# Patient Record
Sex: FEMALE | Race: WHITE | URBAN - METROPOLITAN AREA
[De-identification: names, ages, dates, MRNs, and addresses within clinical notes are randomized per-mention and may not be internally consistent; named-entity substitution may affect disease eponyms.]

---

## 2017-01-03 DIAGNOSIS — M79.7 FIBROMYALGIA: Primary | ICD-10-CM

## 2017-01-03 RX ORDER — PREGABALIN 100 MG
CAPSULE ORAL
Qty: 270 CAPSULE | Refills: 0 | Status: SHIPPED | OUTPATIENT
Start: 2017-01-03 | End: 2017-02-06

## 2017-01-03 NOTE — TELEPHONE ENCOUNTER
Lyrica      Last Written Prescription Date:  11/14/16  Last Fill Quantity: 90,   # refills: 1  Last Office Visit with [facility], [facility] or [facility] prescribing provider: 07/05/16 Mariana Brantley PA-C, [facility]    Future Office visit:       Routing refill request to provider for review/approval because:  Drug not on the [facility], [facility] or [facility] refill protocol or controlled substance    NINA JackR)

## 2017-01-04 DIAGNOSIS — F43.10 PTSD (POST-TRAUMATIC STRESS DISORDER): Primary | ICD-10-CM

## 2017-01-04 RX ORDER — PRAZOSIN HYDROCHLORIDE 1 MG/1
2-3 CAPSULE ORAL AT BEDTIME
Qty: 90 CAPSULE | Refills: 0 | Status: SHIPPED | OUTPATIENT
Start: 2017-01-04 | End: 2017-03-22

## 2017-01-04 NOTE — TELEPHONE ENCOUNTER
Sailaja Garcias last visit with Dr. Khan 8/24/16. Has since had two no show's for appointments with him.     Refill request rec'd for Prazosin. Will route to PCP.

## 2017-01-06 ENCOUNTER — OFFICE VISIT (OUTPATIENT)
Dept: FAMILY MEDICINE | Facility: CLINIC | Age: 42
End: 2017-01-06
Payer: COMMERCIAL

## 2017-01-06 VITALS
HEART RATE: 66 BPM | SYSTOLIC BLOOD PRESSURE: 106 MMHG | HEIGHT: 65 IN | RESPIRATION RATE: 12 BRPM | DIASTOLIC BLOOD PRESSURE: 70 MMHG | TEMPERATURE: 97.7 F | BODY MASS INDEX: 19.86 KG/M2 | WEIGHT: 119.2 LBS | OXYGEN SATURATION: 100 %

## 2017-01-06 DIAGNOSIS — R55 SYNCOPE, UNSPECIFIED SYNCOPE TYPE: ICD-10-CM

## 2017-01-06 DIAGNOSIS — M25.561 CHRONIC PAIN OF RIGHT KNEE: ICD-10-CM

## 2017-01-06 DIAGNOSIS — Z23 NEED FOR PROPHYLACTIC VACCINATION AND INOCULATION AGAINST INFLUENZA: ICD-10-CM

## 2017-01-06 DIAGNOSIS — F31.81 BIPOLAR 2 DISORDER (H): Primary | ICD-10-CM

## 2017-01-06 DIAGNOSIS — R10.84 ABDOMINAL PAIN, GENERALIZED: ICD-10-CM

## 2017-01-06 DIAGNOSIS — F41.1 GENERALIZED ANXIETY DISORDER: ICD-10-CM

## 2017-01-06 DIAGNOSIS — M79.7 FIBROMYALGIA: ICD-10-CM

## 2017-01-06 DIAGNOSIS — Z12.31 VISIT FOR SCREENING MAMMOGRAM: ICD-10-CM

## 2017-01-06 DIAGNOSIS — G89.4 CHRONIC PAIN SYNDROME: ICD-10-CM

## 2017-01-06 DIAGNOSIS — K52.9 CHRONIC DIARRHEA: ICD-10-CM

## 2017-01-06 DIAGNOSIS — G89.29 CHRONIC PAIN OF RIGHT KNEE: ICD-10-CM

## 2017-01-06 DIAGNOSIS — F33.1 MODERATE RECURRENT MAJOR DEPRESSION (H): ICD-10-CM

## 2017-01-06 LAB
ALBUMIN SERPL-MCNC: 4.7 G/DL (ref 3.4–5)
ALP SERPL-CCNC: 78 U/L (ref 40–150)
ALT SERPL W P-5'-P-CCNC: 23 U/L (ref 0–50)
ANION GAP SERPL CALCULATED.3IONS-SCNC: 6 MMOL/L (ref 3–14)
AST SERPL W P-5'-P-CCNC: 20 U/L (ref 0–45)
BASOPHILS # BLD AUTO: 0 10E9/L (ref 0–0.2)
BASOPHILS NFR BLD AUTO: 0.5 %
BILIRUB SERPL-MCNC: 0.5 MG/DL (ref 0.2–1.3)
BUN SERPL-MCNC: 14 MG/DL (ref 7–30)
CALCIUM SERPL-MCNC: 9.8 MG/DL (ref 8.5–10.1)
CHLORIDE SERPL-SCNC: 91 MMOL/L (ref 94–109)
CO2 SERPL-SCNC: 39 MMOL/L (ref 20–32)
CREAT SERPL-MCNC: 1.07 MG/DL (ref 0.52–1.04)
DIFFERENTIAL METHOD BLD: NORMAL
EOSINOPHIL # BLD AUTO: 0.2 10E9/L (ref 0–0.7)
EOSINOPHIL NFR BLD AUTO: 3.1 %
ERYTHROCYTE [DISTWIDTH] IN BLOOD BY AUTOMATED COUNT: 14.6 % (ref 10–15)
GFR SERPL CREATININE-BSD FRML MDRD: 56 ML/MIN/1.7M2
GLUCOSE SERPL-MCNC: 83 MG/DL (ref 70–99)
HCT VFR BLD AUTO: 40.9 % (ref 35–47)
HGB BLD-MCNC: 13.6 G/DL (ref 11.7–15.7)
LIPASE SERPL-CCNC: 128 U/L (ref 73–393)
LYMPHOCYTES # BLD AUTO: 1.5 10E9/L (ref 0.8–5.3)
LYMPHOCYTES NFR BLD AUTO: 23.5 %
MCH RBC QN AUTO: 27.1 PG (ref 26.5–33)
MCHC RBC AUTO-ENTMCNC: 33.3 G/DL (ref 31.5–36.5)
MCV RBC AUTO: 82 FL (ref 78–100)
MONOCYTES # BLD AUTO: 0.6 10E9/L (ref 0–1.3)
MONOCYTES NFR BLD AUTO: 8.8 %
NEUTROPHILS # BLD AUTO: 4.1 10E9/L (ref 1.6–8.3)
NEUTROPHILS NFR BLD AUTO: 64.1 %
PLATELET # BLD AUTO: 324 10E9/L (ref 150–450)
POTASSIUM SERPL-SCNC: 3.2 MMOL/L (ref 3.4–5.3)
PROT SERPL-MCNC: 7.9 G/DL (ref 6.8–8.8)
RBC # BLD AUTO: 5.02 10E12/L (ref 3.8–5.2)
SODIUM SERPL-SCNC: 136 MMOL/L (ref 133–144)
TSH SERPL DL<=0.005 MIU/L-ACNC: 0.97 MU/L (ref 0.4–4)
WBC # BLD AUTO: 6.5 10E9/L (ref 4–11)

## 2017-01-06 PROCEDURE — 99214 OFFICE O/P EST MOD 30 MIN: CPT | Mod: 25 | Performed by: PHYSICIAN ASSISTANT

## 2017-01-06 PROCEDURE — 83690 ASSAY OF LIPASE: CPT | Mod: 90 | Performed by: PHYSICIAN ASSISTANT

## 2017-01-06 PROCEDURE — 36415 COLL VENOUS BLD VENIPUNCTURE: CPT | Performed by: PHYSICIAN ASSISTANT

## 2017-01-06 PROCEDURE — 90471 IMMUNIZATION ADMIN: CPT | Performed by: PHYSICIAN ASSISTANT

## 2017-01-06 PROCEDURE — 80050 GENERAL HEALTH PANEL: CPT | Performed by: PHYSICIAN ASSISTANT

## 2017-01-06 PROCEDURE — 99000 SPECIMEN HANDLING OFFICE-LAB: CPT | Performed by: PHYSICIAN ASSISTANT

## 2017-01-06 PROCEDURE — 83516 IMMUNOASSAY NONANTIBODY: CPT | Mod: 90 | Performed by: PHYSICIAN ASSISTANT

## 2017-01-06 PROCEDURE — 90686 IIV4 VACC NO PRSV 0.5 ML IM: CPT | Performed by: PHYSICIAN ASSISTANT

## 2017-01-06 RX ORDER — CLONAZEPAM 1 MG/1
TABLET ORAL
Qty: 30 TABLET | Refills: 0 | Status: SHIPPED | OUTPATIENT
Start: 2017-01-06 | End: 2017-02-28

## 2017-01-06 RX ORDER — LAMOTRIGINE 100 MG/1
TABLET ORAL
Qty: 21 TABLET | Refills: 0 | Status: SHIPPED | OUTPATIENT
Start: 2017-01-06 | End: 2017-01-31

## 2017-01-06 RX ORDER — VENLAFAXINE HYDROCHLORIDE 37.5 MG/1
CAPSULE, EXTENDED RELEASE ORAL
Qty: 46 CAPSULE | Refills: 0 | Status: SHIPPED | OUTPATIENT
Start: 2017-01-06 | End: 2017-02-06 | Stop reason: DRUGHIGH

## 2017-01-06 RX ORDER — NORTRIPTYLINE HCL 10 MG
CAPSULE ORAL
Qty: 60 CAPSULE | Refills: 1 | Status: SHIPPED | OUTPATIENT
Start: 2017-01-06 | End: 2017-03-07

## 2017-01-06 RX ORDER — HYDROXYZINE PAMOATE 25 MG/1
CAPSULE ORAL
Qty: 120 CAPSULE | Refills: 0 | Status: SHIPPED | OUTPATIENT
Start: 2017-01-06 | End: 2017-03-23

## 2017-01-06 ASSESSMENT — ANXIETY QUESTIONNAIRES
3. WORRYING TOO MUCH ABOUT DIFFERENT THINGS: NEARLY EVERY DAY
GAD7 TOTAL SCORE: 21
1. FEELING NERVOUS, ANXIOUS, OR ON EDGE: NEARLY EVERY DAY
IF YOU CHECKED OFF ANY PROBLEMS ON THIS QUESTIONNAIRE, HOW DIFFICULT HAVE THESE PROBLEMS MADE IT FOR YOU TO DO YOUR WORK, TAKE CARE OF THINGS AT HOME, OR GET ALONG WITH OTHER PEOPLE: EXTREMELY DIFFICULT
5. BEING SO RESTLESS THAT IT IS HARD TO SIT STILL: NEARLY EVERY DAY
2. NOT BEING ABLE TO STOP OR CONTROL WORRYING: NEARLY EVERY DAY
7. FEELING AFRAID AS IF SOMETHING AWFUL MIGHT HAPPEN: NEARLY EVERY DAY
6. BECOMING EASILY ANNOYED OR IRRITABLE: NEARLY EVERY DAY

## 2017-01-06 ASSESSMENT — PATIENT HEALTH QUESTIONNAIRE - PHQ9: 5. POOR APPETITE OR OVEREATING: NEARLY EVERY DAY

## 2017-01-06 NOTE — PROGRESS NOTES
SUBJECTIVE:                                                    Sailaja Garcias is a 41 year old female who presents to clinic today for the following health issues:        Chronic Pain Follow-Up       Type / Location of Pain: Fibromyalgia  Analgesia/pain control:       Recent changes:  worse      Overall control: Tolerable with discomfort  Activity level/function:      Daily activities:  Unable to perform most daily activities - chores, hobbies, social activities, driving    Work:  not applicable  Adverse effects:  No  Adherance    Taking medication as directed?  No: Has not had insurance coverage    Participating in other treatments: no -   Risk Factors:    Sleep:  Poor    Mood/anxiety:  worsened    Recent family or social stressors:  none noted and yes just a lot going on    Other aggravating factors: prolonged sitting, prolonged standing and poor posture  PHQ-9 SCORE 4/7/2016 6/20/2016 1/6/2017   Total Score - - -   Total Score 23 24 21     ANA LAURA-7 SCORE 1/21/2016 4/7/2016 1/6/2017   Total Score - - -   Total Score 21 20 21     Encounter-Level CSA:     There are no encounter-level csa.             Amount of exercise or physical activity: None    Problems taking medications regularly: yes    Medication side effects: none    Diet: regular (no restrictions)    Has been off all medications because insurance ran out since did not receive notification due to eviction.  Currently living in basement of mother's home (where had been evicted from last halloween).  Lived in hotel for a while.  Reports home is a toxic environment but has no options.  Working with [employer] worker.  Reports no assistance for single mother of 4.  [place] kids [place].    Reports depressed mood. No thought of harming self or others.   Can't sleep.  Can't get comfortable.  Sitting for any period of time is quite painful.  Can't sit to drive.  Tizanidine helpful in past.   Reports that lamictal really  "helped control flashbacks but \"makes me an airhead\".  Reports trouble finding words at times -ie can't think of the word spatula -looking for word to flip pancakes over.  No showed (she reports she called to cancel too late) and has been unable to reschedule with Dr. Khan due to no show      Has tried neurontin in past without relief.  lyrica has provided relief of fibromyalgia in past.     Complains of diarrhea at times with blood on and off for a year.  Concerned about Crohns or colitis.  Also complains of discomfort and sharp pain after eating.  Nauseated without emesis    Complains of right knee pain.  Last month (12/10/16)  were called to home because had complained of suicidal intentions to strangers.  Knee xray was negative.  Was evaluated by behavioral health and not holdable and determined not a threat to self or others and sent home.     Problem list and histories reviewed & adjusted, as indicated.  Additional history: as documented    Patient Active Problem List   Diagnosis     CARDIOVASCULAR SCREENING; LDL GOAL LESS THAN 160     Fibromyalgia     Insomnia     Cervical cancer (H)     Generalized anxiety disorder     Chronic pain     Moderate recurrent major depression (H)     Eating disorder     Past Surgical History   Procedure Laterality Date     Bunionectomy Bilateral age 13     Gyn surgery       D&C x 3     Hc enlarge breast with implant  2005     elective       Social History   Substance Use Topics     Smoking status: Former Smoker -- 0.50 packs/day     Types: Cigarettes     Quit date: 04/28/2011     Smokeless tobacco: Not on file      Comment: restarted two days ago     Alcohol Use: No      Comment: quit drinking 5/1/16     Family History   Problem Relation Age of Onset     DIABETES Mother      Hypertension Mother      Hyperlipidemia Mother      OSTEOPOROSIS Mother      Hypertension Father          Current Outpatient Prescriptions   Medication Sig Dispense Refill     None --has been off all " "medications due to no insurance                                                                                                                                                                  ROS:  Constitutional, HEENT, cardiovascular, pulmonary, gi and gu systems are negative, except as otherwise noted.    OBJECTIVE:                                                    /70 mmHg  Pulse 66  Temp(Src) 97.7  F (36.5  C) (Oral)  Resp 12  Ht 1.638 m (5' 4.5\")  Wt 54.069 kg (119 lb 3.2 oz)  BMI 20.15 kg/m2  SpO2 100%  Body mass index is 20.15 kg/(m^2).  GENERAL: healthy, alert and no distress  NECK: no adenopathy, no asymmetry, masses, or scars and thyroid normal to palpation  RESP: lungs clear to auscultation - no rales, rhonchi or wheezes  CV: regular rate and rhythm, normal S1 S2, no S3 or S4, no murmur, click or rub, no peripheral edema and peripheral pulses strong  ABDOMEN: tenderness diffusely and bowel sounds normal  MS: left knee with brace.  No effusion. Normal range of motion.  Normal gait.     Diagnostic Test Results:  Results for orders placed or performed in visit on 01/06/17   CBC with platelets differential   Result Value Ref Range    WBC 6.5 4.0 - 11.0 10e9/L    RBC Count 5.02 3.8 - 5.2 10e12/L    Hemoglobin 13.6 11.7 - 15.7 g/dL    Hematocrit 40.9 35.0 - 47.0 %    MCV 82 78 - 100 fl    MCH 27.1 26.5 - 33.0 pg    MCHC 33.3 31.5 - 36.5 g/dL    RDW 14.6 10.0 - 15.0 %    Platelet Count 324 150 - 450 10e9/L    Diff Method Automated Method     % Neutrophils 64.1 %    % Lymphocytes 23.5 %    % Monocytes 8.8 %    % Eosinophils 3.1 %    % Basophils 0.5 %    Absolute Neutrophil 4.1 1.6 - 8.3 10e9/L    Absolute Lymphocytes 1.5 0.8 - 5.3 10e9/L    Absolute Monocytes 0.6 0.0 - 1.3 10e9/L    Absolute Eosinophils 0.2 0.0 - 0.7 10e9/L    Absolute Basophils 0.0 0.0 - 0.2 10e9/L   Comprehensive metabolic panel   Result Value Ref Range    Sodium 136 133 - 144 mmol/L    Potassium 3.2 (L) 3.4 - 5.3 mmol/L    " Chloride 91 (L) 94 - 109 mmol/L    Carbon Dioxide 39 (H) 20 - 32 mmol/L    Anion Gap 6 3 - 14 mmol/L    Glucose 83 70 - 99 mg/dL    Urea Nitrogen 14 7 - 30 mg/dL    Creatinine 1.07 (H) 0.52 - 1.04 mg/dL    GFR Estimate 56 (L) >60 mL/min/1.7m2    GFR Estimate If Black 67 >60 mL/min/1.7m2    Calcium 9.8 8.5 - 10.1 mg/dL    Bilirubin Total 0.5 0.2 - 1.3 mg/dL    Albumin 4.7 3.4 - 5.0 g/dL    Protein Total 7.9 6.8 - 8.8 g/dL    Alkaline Phosphatase 78 40 - 150 U/L    ALT 23 0 - 50 U/L    AST 20 0 - 45 U/L   TSH with free T4 reflex   Result Value Ref Range    TSH 0.97 0.40 - 4.00 mU/L   Lipase   Result Value Ref Range    Lipase 128 73 - 393 U/L        ASSESSMENT/PLAN:                                                            1. Bipolar 2 disorder (H)  Will start at low dose lamictal.  Needs follow up with psychiatry  - lamoTRIgine (LAMICTAL) 100 MG tablet; Take 1/2 tablet daily for 2 weeks, then 1/2 tablet twice daily  Dispense: 21 tablet; Refill: 0  - venlafaxine (EFFEXOR-XR) 37.5 MG 24 hr capsule; Take 1 capsule daily for 14 days, then take 2 capsules daily.  Dispense: 46 capsule; Refill: 0    2. Moderate recurrent major depression (H)  Restart effexor   - venlafaxine (EFFEXOR-XR) 37.5 MG 24 hr capsule; Take 1 capsule daily for 14 days, then take 2 capsules daily.  Dispense: 46 capsule; Refill: 0    3. Abdominal pain, generalized  Abdominal exam appears benign  - GASTROENTEROLOGY ADULT REFERRAL +/- PROCEDURE  - CBC with platelets differential  - Comprehensive metabolic panel  - TSH with free T4 reflex  - GASTROENTEROLOGY ADULT REFERRAL +/- PROCEDURE  - Lipase  - Tissue transglutaminase cammy IgA and IgG    4. Chronic diarrhea  Referred to GI for evaluation and colonoscopy   - GASTROENTEROLOGY ADULT REFERRAL +/- PROCEDURE  - CBC with platelets differential  - Comprehensive metabolic panel  - TSH with free T4 reflex  - GASTROENTEROLOGY ADULT REFERRAL +/- PROCEDURE  - Lipase  - Tissue transglutaminase cammy IgA and IgG    5.  Chronic pain of right knee  Normal xray of right knee through EMERGENCY DEPARTMENT   - ORTHOPEDICS ADULT REFERRAL    6. Chronic pain syndrome    - tiZANidine (ZANAFLEX) 4 MG tablet; TAKE ONE TABLET BY MOUTH THREE TIMES DAILY AS NEEDED for muscle spasm  Dispense: 90 tablet; Refill: 0    7. Visit for screening mammogram    - MA SCREENING DIGITAL BILAT - Future  (s+30); Future    8. Need for prophylactic vaccination and inoculation against influenza    - FLU VAC, SPLIT VIRUS IM > 3 YO (QUADRIVALENT) [23380]  - Vaccine Administration, Initial [69801]    9. Fibromyalgia    - tiZANidine (ZANAFLEX) 4 MG tablet; TAKE ONE TABLET BY MOUTH THREE TIMES DAILY AS NEEDED for muscle spasm  Dispense: 90 tablet; Refill: 0    10. Generalized anxiety disorder    - clonazePAM (KLONOPIN) 1 MG tablet; TAKE 1/2 TO 1 TABLET BY MOUTH TWO TIMES A DAY AS NEEDED FOR ANXIETY  Dispense: 30 tablet; Refill: 0  - hydrOXYzine (VISTARIL) 25 MG capsule; TAKE 2 CAPSULES BY MOUTH 3 TIMES DAILY AS NEEDED FOR ITCHING OR ANXIETY.  Dispense: 120 capsule; Refill: 0  - cholecalciferol (VITAMIN D3) 5000 UNITS CAPS capsule; Take 1 capsule (5,000 Units) by mouth daily Take 1 per day  Dispense: 100 capsule; Refill: 2    11. Syncope, unspecified syncope type    - nortriptyline (PAMELOR) 10 MG capsule; TAKE 2 CAPSULES (20 MG) BY MOUTH AT BEDTIME  Dispense: 60 capsule; Refill: 1    Patient Instructions   Follow up with [facility]opedics at [facility] ([phone]).    Restart medications at lower doses.   We will work on Prior Auth for lyrica  Follow up with Dr. Mcgee no more than one month.   Continue to work on getting in with psychiatrist      CC chart to PCP     Mariana Brantley PA-C  [facility]    Injectable Influenza Immunization Documentation    1.  Is the person to be vaccinated sick today?  No    2. Does the person to be vaccinated have an allergy to eggs or to a component of the vaccine?  No    3. Has  the person to be vaccinated today ever had a serious reaction to influenza vaccine in the past?  No    4. Has the person to be vaccinated ever had Guillain-[place] syndrome?  No     Form completed by LUISA

## 2017-01-06 NOTE — PATIENT INSTRUCTIONS
Follow up with orthopedics at [facility] ([phone]).    Restart medications at lower doses.   We will work on Prior Auth for lyrica  Follow up with Dr. Mcgee no more than one month.   Continue to work on getting in with psychiatrist

## 2017-01-06 NOTE — MR AVS SNAPSHOT
After Visit Summary   1/6/2017    Sailaja Garcias    MRN: [id number]           Patient Information     Date Of Birth          1975        Visit Information        Provider Department      1/6/2017 11:40 AM Mariana Brantley PA-C [facility]        Today's Diagnoses     Visit for screening mammogram    -  1     Need for prophylactic vaccination and inoculation against influenza         Fibromyalgia         Chronic pain syndrome         Generalized anxiety disorder         Syncope, unspecified syncope type         Bipolar 2 disorder (H)         Moderate recurrent major depression (H)         Chronic pain of right knee           Care Instructions    Follow up with orthopedics at [facility] ([phone]).    Restart medications at lower doses.   We will work on Prior Auth for lyrica  Follow up with Dr. Mcgee no more than one month.   Continue to work on getting in with psychiatrist         Follow-ups after your visit        Additional Services     ORTHOPEDICS ADULT REFERRAL       Your provider has referred you to: [facility] [phone]   [email or web address]    Please be aware that coverage of these services is subject to the terms and limitations of your health insurance plan.  Call member services at your health plan with any benefit or coverage questions.      Please bring the following to your appointment:    >>   Any x-rays, CTs or MRIs which have been performed.  Contact the facility where they were done to arrange for  prior to your scheduled appointment.    >>   List of current medications   >>   This referral request   >>   Any documents/labs given to you for this referral                  Future tests that were ordered for you today     Open Future Orders        Priority Expected  "Expires Ordered    MA SCREENING DIGITAL BILAT - Future  (s+30) Routine  1/6/2018 1/6/2017            Who to contact     If you have questions or need follow up information about today's clinic visit or your schedule please contact [facility] directly at [phone].  Normal or non-critical lab and imaging results will be communicated to you by MyChart, letter or phone within 4 business days after the clinic has received the results. If you do not hear from us within 7 days, please contact the clinic through [employer] or phone. If you have a critical or abnormal lab result, we will notify you by phone as soon as possible.  Submit refill requests through [employer] or call your pharmacy and they will forward the refill request to us. Please allow 3 business days for your refill to be completed.          Additional Information About Your Visit        MyChart Information     [employer] gives you secure access to your electronic health record. If you see a primary care provider, you can also send messages to your care team and make appointments. If you have questions, please call your primary care clinic.  If you do not have a primary care provider, please call [phone] and they will assist you.        Care EveryWhere ID     This is your Care EveryWhere ID. This could be used by other organizations to access your [facility] medical records  [id number]        Your Vitals Were     Pulse Temperature Respirations Height BMI (Body Mass Index) Pulse Oximetry    66 97.7  F (36.5  C) (Oral) 12 1.638 m (5' 4.5\") 20.15 kg/m2 100%       Blood Pressure from Last 3 Encounters:   01/06/17 106/70   07/15/16 118/60   06/20/16 124/76    Weight from Last 3 Encounters:   01/06/17 54.069 kg (119 lb 3.2 oz)   07/15/16 58.469 kg (128 lb 14.4 oz)   06/20/16 58.333 kg (128 lb 9.6 oz)              We Performed the Following     FLU VAC, SPLIT VIRUS IM > 3 YO (QUADRIVALENT) [75127]     ORTHOPEDICS ADULT REFERRAL     Vaccine " Administration, Initial [84384]          Today's Medication Changes          These changes are accurate as of: 1/6/17 12:19 PM.  If you have any questions, ask your nurse or doctor.               These medicines have changed or have updated prescriptions.        Dose/Directions    clonazePAM 1 MG tablet   Commonly known as:  klonoPIN   This may have changed:  See the new instructions.   Used for:  Generalized anxiety disorder   Changed by:  Mariana Brantley PA-C        TAKE 1/2 TO 1 TABLET BY MOUTH TWO TIMES A DAY AS NEEDED FOR ANXIETY   Quantity:  30 tablet   Refills:  0       hydrOXYzine 25 MG capsule   Commonly known as:  VISTARIL   This may have changed:  See the new instructions.   Used for:  Generalized anxiety disorder   Changed by:  Mariana Brantley PA-C        TAKE 2 CAPSULES BY MOUTH 3 TIMES DAILY AS NEEDED FOR ITCHING OR ANXIETY.   Quantity:  120 capsule   Refills:  0       * lamoTRIgine 200 MG tablet   Commonly known as:  LaMICtal   This may have changed:  Another medication with the same name was added. Make sure you understand how and when to take each.   Used for:  Bipolar II disorder (H)   Changed by:  Ren Khan MD        Take 2 per day by mouth (400 mg per day)   Quantity:  60 tablet   Refills:  5       * lamoTRIgine 100 MG tablet   Commonly known as:  LAMICTAL   This may have changed:  You were already taking a medication with the same name, and this prescription was added. Make sure you understand how and when to take each.   Used for:  Bipolar 2 disorder (H)   Changed by:  Mariana Brantley PA-C        Take 1/2 tablet daily for 2 weeks, then 1/2 tablet twice daily   Quantity:  21 tablet   Refills:  0       tiZANidine 4 MG tablet   Commonly known as:  ZANAFLEX   This may have changed:  See the new instructions.   Used for:  Fibromyalgia, Chronic pain syndrome   Changed by:  Mariana Brantley PA-C        TAKE ONE TABLET BY MOUTH THREE TIMES DAILY AS NEEDED for muscle  spasm   Quantity:  90 tablet   Refills:  0       * venlafaxine 75 MG 24 hr capsule   Commonly known as:  EFFEXOR-XR   This may have changed:  Another medication with the same name was added. Make sure you understand how and when to take each.   Used for:  Generalized anxiety disorder   Changed by:  Ren Khan MD        Take 2 to 3 per day   Quantity:  90 capsule   Refills:  5       * venlafaxine 37.5 MG 24 hr capsule   Commonly known as:  EFFEXOR-XR   This may have changed:  You were already taking a medication with the same name, and this prescription was added. Make sure you understand how and when to take each.   Used for:  Bipolar 2 disorder (H), Moderate recurrent major depression (H)   Changed by:  Mariana Brantley PA-C        Take 1 capsule daily for 14 days, then take 2 capsules daily.   Quantity:  46 capsule   Refills:  0       * Notice:  This list has 4 medication(s) that are the same as other medications prescribed for you. Read the directions carefully, and ask your doctor or other care provider to review them with you.         Where to get your medicines      These medications were sent to [facility] [address] [place] [address] [employer]  AT [facility] [address] [address] [employer] , [facility] [address]     Phone:  [phone]    - cholecalciferol 5000 UNITS Caps capsule  - hydrOXYzine 25 MG capsule  - lamoTRIgine 100 MG tablet  - nortriptyline 10 MG capsule  - tiZANidine 4 MG tablet  - venlafaxine 37.5 MG 24 hr capsule      Some of these will need a paper prescription and others can be bought over the counter.  Ask your nurse if you have questions.     Bring a paper prescription for each of these medications    - clonazePAM 1 MG tablet             Primary Care Provider Office Phone # Fax #    Odalys Mcgee -[phone]       [address]  [facility] [address]        Thank you!     Thank you for choosing [facility]  for your care. Our goal is always to provide you with excellent care. Hearing back from our patients is one way we can continue to improve our services. Please take a few minutes to complete the written survey that you may receive in the mail after your visit with us. Thank you!             Your Updated Medication List - Protect others around you: Learn how to safely use, store and throw away your medicines at www.disposemymeds.org.          This list is accurate as of: 1/6/17 12:19 PM.  Always use your most recent med list.                   Brand Name Dispense Instructions for use    cholecalciferol 5000 UNITS Caps capsule    vitamin D3    100 capsule    Take 1 capsule (5,000 Units) by mouth daily Take 1 per day       clonazePAM 1 MG tablet    klonoPIN    30 tablet    TAKE 1/2 TO 1 TABLET BY MOUTH TWO TIMES A DAY AS NEEDED FOR ANXIETY       hydrOXYzine 25 MG capsule    VISTARIL    120 capsule    TAKE 2 CAPSULES BY MOUTH 3 TIMES DAILY AS NEEDED FOR ITCHING OR ANXIETY.       IBUPROFEN PO      Take 800 mg by mouth as needed for moderate pain       * lamoTRIgine 200 MG tablet    LaMICtal    60 tablet    Take 2 per day by mouth (400 mg per day)       * lamoTRIgine 100 MG tablet    LAMICTAL    21 tablet    Take 1/2 tablet daily for 2 weeks, then 1/2 tablet twice daily       lidocaine 5 % Patch    LIDODERM         LYRICA 100 MG capsule   Generic drug:  pregabalin     270 capsule    TAKE ONE CAPSULE BY MOUTH THREE TIMES DAILY       multivitamin, therapeutic with minerals Tabs tablet      Take 1 tablet by mouth daily       naltrexone 50 MG tablet    DEPADE;REVIA    30 tablet    Take 1 per day       naproxen 500 MG tablet    NAPROSYN    60 tablet    TAKE 1 TABLET (500 MG) BY MOUTH 2 TIMES DAILY AS NEEDED FOR MODERATE PAIN       nortriptyline 10 MG capsule    PAMELOR    60 capsule    TAKE 2 CAPSULES (20 MG) BY MOUTH AT BEDTIME       prazosin 1 MG capsule    MINIPRESS    90 capsule    Take 2-3 capsules (2-3 mg) by mouth  At Bedtime       tazarotene 0.05 % Crea cream      Apply to face, back and chest once daily       tiZANidine 4 MG tablet    ZANAFLEX    90 tablet    TAKE ONE TABLET BY MOUTH THREE TIMES DAILY AS NEEDED for muscle spasm       valACYclovir 1000 mg tablet    VALTREX    4 tablet    Take 2 tablets (2,000 mg) by mouth 2 times daily       * venlafaxine 75 MG 24 hr capsule    EFFEXOR-XR    90 capsule    Take 2 to 3 per day       * venlafaxine 37.5 MG 24 hr capsule    EFFEXOR-XR    46 capsule    Take 1 capsule daily for 14 days, then take 2 capsules daily.       VOLTAREN 1 % Gel topical gel   Generic drug:  diclofenac          * Notice:  This list has 4 medication(s) that are the same as other medications prescribed for you. Read the directions carefully, and ask your doctor or other care provider to review them with you.

## 2017-01-06 NOTE — NURSING NOTE
"Chief Complaint   Patient presents with     Forms     Refill Request       Initial /70 mmHg  Pulse 66  Temp(Src) 97.7  F (36.5  C) (Oral)  Resp 12  Ht 1.638 m (5' 4.5\")  Wt 54.069 kg (119 lb 3.2 oz)  BMI 20.15 kg/m2  SpO2 100% Estimated body mass index is 20.15 kg/(m^2) as calculated from the following:    Height as of this encounter: 1.638 m (5' 4.5\").    Weight as of this encounter: 54.069 kg (119 lb 3.2 oz).  BP completed using cuff size: art Martinez        "

## 2017-01-07 ASSESSMENT — PATIENT HEALTH QUESTIONNAIRE - PHQ9: SUM OF ALL RESPONSES TO PHQ QUESTIONS 1-9: 21

## 2017-01-07 ASSESSMENT — ANXIETY QUESTIONNAIRES: GAD7 TOTAL SCORE: 21

## 2017-01-08 ENCOUNTER — TELEPHONE (OUTPATIENT)
Dept: FAMILY MEDICINE | Facility: CLINIC | Age: 42
End: 2017-01-08

## 2017-01-08 DIAGNOSIS — E87.6 HYPOKALEMIA: Primary | ICD-10-CM

## 2017-01-08 DIAGNOSIS — R94.4 DECREASED GFR: ICD-10-CM

## 2017-01-08 RX ORDER — POTASSIUM CHLORIDE 750 MG/1
20 TABLET, EXTENDED RELEASE ORAL DAILY
Qty: 60 TABLET | Refills: 1 | Status: SHIPPED | OUTPATIENT
Start: 2017-01-08 | End: 2017-02-07

## 2017-01-08 NOTE — PROGRESS NOTES
Quick Note:    Jostin Sailaja  Your potassium was low and I recommend potassium supplement. 20 meq daily and recheck labs in 2 weeks. A prescription has been sent to your [facility] pharmacy  Your kidney function was also a bit decreased. Do not take aspirin, aleve or ibuprofen and we will recheck that in 2 weeks as well. Your thyroid function and pancreas function were normal.   Please call or MyChart my office with any questions or concerns.   Mariana Brantley, PAC            ______

## 2017-01-08 NOTE — TELEPHONE ENCOUNTER
Please call and advise that potassium was low.  Start oral potassium 20 meq daily and recheck potassium in approximately 2 weeks. May schedule lab only appointment   Prescription at [facility].  Kidney function also a little off.  No nsaids and drink lots of water and will recheck in 2 weeks.

## 2017-01-09 NOTE — TELEPHONE ENCOUNTER
Sounds like PA needs to be redone - completed on Covermymeds.    Johnson: [id number]  Will Ida HERNANDEZ

## 2017-01-09 NOTE — TELEPHONE ENCOUNTER
Called pt, no answer. Left detailed message with info below. I don't see that a PA was started? Can someone else verify this.    Siva Denise, [facility]

## 2017-01-09 NOTE — TELEPHONE ENCOUNTER
Phone call to pt and updated on below. Pt verbalizes understanding  Pt will call back to make lab appointment. Pt takes naproxen 500 mg bid and ibuprofen 800 mg frequently. Pt wanting to know what else she can take for pain since her lyrica was stopped. Will route to Dr. Bratnley   for review and orders. Kayla Peoples RN

## 2017-01-09 NOTE — TELEPHONE ENCOUNTER
Recommend tylenol up to 650 mg every 4 hours as needed for pain.  No more than 6 tablets in one day.  Please find out where we are at with lyrica prior auth

## 2017-01-12 LAB
TTG IGA SER-ACNC: 1 U/ML
TTG IGG SER-ACNC: NORMAL U/ML

## 2017-01-12 NOTE — PROGRESS NOTES
Quick Note:    Jostin Menard  Your tests for celiac disease were negative.   Please call or MyChart my office with any questions or concerns.   HARVEY Hi            ______

## 2017-01-16 NOTE — TELEPHONE ENCOUNTER
Resent PA over CoverMyMeds. - have not received response from insurance    Johnson: [id number]    Destin Prieto MA

## 2017-01-17 ENCOUNTER — PRE VISIT (OUTPATIENT)
Dept: ORTHOPEDICS | Facility: CLINIC | Age: 42
End: 2017-01-17

## 2017-01-17 ENCOUNTER — TELEPHONE (OUTPATIENT)
Dept: FAMILY MEDICINE | Facility: CLINIC | Age: 42
End: 2017-01-17

## 2017-01-17 NOTE — TELEPHONE ENCOUNTER
Routing to provider, please advise. PA just sent out yesterday so have not heard anything back yet    Shari Martinez MA

## 2017-01-17 NOTE — TELEPHONE ENCOUNTER
[place] [place] [place] [facility] imaging.     Will be pushed over.

## 2017-01-17 NOTE — TELEPHONE ENCOUNTER
Needs to get supplies and prescription for colonoscopy per specialist. Will need to call them.   Prior auth for Lyrica is not back yet.  Please call and advise

## 2017-01-17 NOTE — TELEPHONE ENCOUNTER
Pt needs supplies for colonoscopy   miralax, and other things in the kit  Can you call that to [facility] [phone]    Thank you  Sailaja Garcias ([facility]) [phone] (H)    And checking status of prior auth for Lyrica

## 2017-01-17 NOTE — TELEPHONE ENCOUNTER
Pt reports being seen for : Right knee pain x 5 year been getting worse over the past year  1. Do you have recent xrays (if not seen in Psychiatric)? Xrays done at [facility]  2. Do you have any MRI's (if not seen in EPIC)?No.   3. Have you had surgery in the past for the same issue?No.   4. Are you being referred by another provider? Yes: Dr. Mcgee  If yes-Records in Epic or being obtained by: In epic.  5. Is this work comp or MVA related? No.

## 2017-01-19 ENCOUNTER — OFFICE VISIT (OUTPATIENT)
Dept: ORTHOPEDICS | Facility: CLINIC | Age: 42
End: 2017-01-19
Payer: COMMERCIAL

## 2017-01-19 VITALS
WEIGHT: 120.3 LBS | OXYGEN SATURATION: 100 % | HEIGHT: 64 IN | BODY MASS INDEX: 20.54 KG/M2 | DIASTOLIC BLOOD PRESSURE: 75 MMHG | HEART RATE: 90 BPM | SYSTOLIC BLOOD PRESSURE: 108 MMHG

## 2017-01-19 DIAGNOSIS — M25.561 CHRONIC PAIN OF RIGHT KNEE: Primary | ICD-10-CM

## 2017-01-19 DIAGNOSIS — G89.29 CHRONIC PAIN OF RIGHT KNEE: Primary | ICD-10-CM

## 2017-01-19 PROCEDURE — 20610 DRAIN/INJ JOINT/BURSA W/O US: CPT | Mod: RT | Performed by: ORTHOPAEDIC SURGERY

## 2017-01-19 PROCEDURE — 99203 OFFICE O/P NEW LOW 30 MIN: CPT | Mod: 25 | Performed by: ORTHOPAEDIC SURGERY

## 2017-01-19 ASSESSMENT — PAIN SCALES - GENERAL: PAINLEVEL: MODERATE PAIN (4)

## 2017-01-19 NOTE — PATIENT INSTRUCTIONS
Thanks for coming today.  Ortho/Sports Medicine Clinic  [address] [place] [address]    To schedule future appointments in Ortho Clinic, you may call [phone].    To schedule ordered imaging by your Provider: Call [facility] at [phone]    Keep[employer]x available online at:   [email or web address]    Please call if any further questions or concerns [phone] and ask for the Orthopedic Department. Clinic hours 8 am to 5 pm.    Return to clinic if symptoms worsen.

## 2017-01-19 NOTE — PROGRESS NOTES
Patient seen and examined. Agree with history, physical and imaging as well as Assessment and Plan as detailed by Dr. Umaña.    Assesment: Knee pain, presumed early OA    Plan: offered injection    After written informed consent obtained and signed, after sufficient prepping and sterile technique, 40 mg of kenalog and , 8 cc of 1% lidocaine were injected without complication into the right knee. The patient tolerated the injection well and a sterile dressing was applied.    If helps, can repeat 2-3 x / year, no lifetime max    If not helping, patient to call, will get mri and f/u after      I was present with the resident during the history and exam.  I discussed the case with the resident and agree with the findings as documented in the assessment and plan.

## 2017-01-19 NOTE — NURSING NOTE
"Sailaja Garcias's goals for this visit include: Find a solution for right knee pain  She requests these members of her care team be copied on today's visit information: yes    PCP: Odalys Mcgee    Referring Provider:  No referring provider defined for this encounter.    Chief Complaint   Patient presents with     Consult     Knee Pain     Chronic right knee pain-swelling for over 6 months.       Initial /75 mmHg  Pulse 90  Ht 1.626 m (5' 4\")  Wt 54.568 kg (120 lb 4.8 oz)  BMI 20.64 kg/m2  SpO2 100% Estimated body mass index is 20.64 kg/(m^2) as calculated from the following:    Height as of this encounter: 1.626 m (5' 4\").    Weight as of this encounter: 54.568 kg (120 lb 4.8 oz).  BP completed using cuff size: regular    "

## 2017-01-19 NOTE — PROGRESS NOTES
REFERRING PROVIDER:  Mariana Brantley PA-C.      CHIEF COMPLAINT:  Right-sided knee pain.      HISTORY OF PRESENT ILLNESS:  Ms. Sailaja Segura is a 41-year-old female with a history of fibromyalgia and nonepileptic seizures who presents for right-sided knee pain.  She has had pain in her right knee for last number of years but seems to be worsened over the last 3-6 months.  She has tried things like bracing and ice, which did help a little bit.  She is no longer able to take ibuprofen secondary to her kidney troubles.  She feels like it is in the morning is stiff and in the evening hours it gets more painful.  She also feels like her knee gets fairly swollen and has pain while walking.      She is here today to find out what is going on with her knee and what treatment options we can recommend.        PAST MEDICAL HISTORY:  Includes:   1.  Depression.   2.  Anxiety.   3.  Alcohol abuse, in remission.   4.  Borderline personality disorder.   5.  History of migraine headaches.   6.  PTSD.   7.  Fibromyalgia.   8.  Nonepileptic seizures.      MEDICATIONS:  Per Epic.      ALLERGIES:  Penicillin.      PAST SURGICAL HISTORY:  Includes bunion surgery in 1988 and breast augmentation in 2005.      SOCIAL HISTORY:  She lives in [place].  She is a mother of 4 children.  She quit smoking 5 years ago and is no longer drinking alcohol.      REVIEW OF SYSTEMS:  Positive for vertigo, stomach problems, bowel trouble, bladder trouble, seizures and depression and anxiety and right knee pain.  Remainder of the review of systems is negative.      FAMILY HISTORY:  Positive for diabetes and heart disease.      PHYSICAL EXAMINATION:  On exam, pleasant female in no acute distress.  Normal skin.  Nonlabored breathing.  Examination of right knee, there is a mild effusion that is present, no surgical scars.  She has tenderness over the anterior knee as well as pes bursa and medial joint line.  Range of motion is full extension to 130  degrees of flexion.  She is stable to varus and valgus stress, anterior and posterior drawers and Lachman's are all normal.      IMAGING:  Radiographs, 2 views of the right knee are seen.  There is no significant joint space narrowing.  All  of the films are not entirely adequate, especially on the lateral.      ASSESSMENT:  Ms. Segura is a 41-year-old female with right sided knee pain.  Diagnosis currently is that she has mild joint space disease and patellofemoral arthritic changes.  She also may have a component of the pes bursitis as well.  At this point, we will offer the patient an injection for therapeutic and diagnostic purposes.      The patient was seen and examined with Dr. Owen.  She will follow up on an as-needed basis.      The patient's knee was injected with 9 cc of 1% lidocaine with 40 mg of Kenalog.      Dictated by Gordon Umaña MD, Resident   I have personally examined this patient and have reviewed the clinical presentation and progress note with the resident.  I agree with the treatment plan as outlined.  The plan was formulated with the resident on the day of the resident's dictation.

## 2017-01-23 ENCOUNTER — TRANSFERRED RECORDS (OUTPATIENT)
Dept: HEALTH INFORMATION MANAGEMENT | Facility: CLINIC | Age: 42
End: 2017-01-23

## 2017-01-24 NOTE — TELEPHONE ENCOUNTER
Insurance stayed the same - medica through the state.    Non-Formularly now    Bin # [id number]  N # [id number]  ID # [id number]  Group # [id number]  Phone # [phone]  Phone # [phone]    Destin Prieto MA      I got a message stating I couldn't complete this over CoverMyMeds.    Called phone # above(2nd #) --      PA approved for 1 year. Informed pt through My Chart.    Destin Prieto MA

## 2017-01-30 ENCOUNTER — TRANSFERRED RECORDS (OUTPATIENT)
Dept: HEALTH INFORMATION MANAGEMENT | Facility: CLINIC | Age: 42
End: 2017-01-30

## 2017-01-31 ENCOUNTER — TELEPHONE (OUTPATIENT)
Dept: FAMILY MEDICINE | Facility: CLINIC | Age: 42
End: 2017-01-31

## 2017-01-31 DIAGNOSIS — F31.81 BIPOLAR 2 DISORDER (H): Primary | ICD-10-CM

## 2017-01-31 RX ORDER — LAMOTRIGINE 100 MG/1
TABLET ORAL
Qty: 60 TABLET | Refills: 0 | Status: SHIPPED
Start: 2017-01-31 | End: 2017-03-22

## 2017-01-31 NOTE — TELEPHONE ENCOUNTER
Prescription refilled and increased dose.  Please assist patient with scheduling follow up with psychiatry and/or Dr. Mcgee

## 2017-01-31 NOTE — TELEPHONE ENCOUNTER
lamoTRIgine      Last Written Prescription Date:  01/06/17  Last Fill Quantity: 21,   # refills: 0  Last Office Visit with [facility] prescribing provider: 01/6/17 Mariana Brantley PA-C, VLAD'    Future Office visit:       Routing refill request to provider for review/approval because:  Drug not on the [facility] refill protocol or controlled substance    NINA JackR)

## 2017-02-01 DIAGNOSIS — F31.81 BIPOLAR 2 DISORDER (H): Primary | ICD-10-CM

## 2017-02-01 DIAGNOSIS — F33.1 MODERATE RECURRENT MAJOR DEPRESSION (H): ICD-10-CM

## 2017-02-01 NOTE — TELEPHONE ENCOUNTER
venlafaxine (EFFEXOR-XR) 37.5 MG 24 hr capsule     Last Written Prescription Date: 1/6/17  Last Fill Quantity: 46, # refills: 0  Last Office Visit with [facility] primary care provider:  1/19/17   Next 5 appointments (look out 90 days)     Feb 06, 2017 10:20 AM   [facility]    [address] [place] [id number] [phone]                   Last PHQ-9 score on record=   PHQ-9 SCORE 1/6/2017   Total Score -   Total Score 21

## 2017-02-06 ENCOUNTER — OFFICE VISIT (OUTPATIENT)
Dept: FAMILY MEDICINE | Facility: CLINIC | Age: 42
End: 2017-02-06
Payer: COMMERCIAL

## 2017-02-06 VITALS
HEART RATE: 73 BPM | SYSTOLIC BLOOD PRESSURE: 122 MMHG | WEIGHT: 122 LBS | HEIGHT: 64 IN | DIASTOLIC BLOOD PRESSURE: 82 MMHG | BODY MASS INDEX: 20.83 KG/M2 | TEMPERATURE: 98.2 F | OXYGEN SATURATION: 100 %

## 2017-02-06 DIAGNOSIS — M54.50 CHRONIC MIDLINE LOW BACK PAIN WITHOUT SCIATICA: ICD-10-CM

## 2017-02-06 DIAGNOSIS — G89.29 CHRONIC MIDLINE LOW BACK PAIN WITHOUT SCIATICA: ICD-10-CM

## 2017-02-06 DIAGNOSIS — R94.4 DECREASED GFR: ICD-10-CM

## 2017-02-06 DIAGNOSIS — F31.81 BIPOLAR 2 DISORDER (H): Primary | ICD-10-CM

## 2017-02-06 DIAGNOSIS — G89.4 CHRONIC PAIN SYNDROME: ICD-10-CM

## 2017-02-06 DIAGNOSIS — M79.7 FIBROMYALGIA: ICD-10-CM

## 2017-02-06 DIAGNOSIS — E87.6 HYPOKALEMIA: ICD-10-CM

## 2017-02-06 DIAGNOSIS — F33.1 MODERATE RECURRENT MAJOR DEPRESSION (H): ICD-10-CM

## 2017-02-06 LAB
ANION GAP SERPL CALCULATED.3IONS-SCNC: 7 MMOL/L (ref 3–14)
BUN SERPL-MCNC: 6 MG/DL (ref 7–30)
CALCIUM SERPL-MCNC: 9.7 MG/DL (ref 8.5–10.1)
CHLORIDE SERPL-SCNC: 100 MMOL/L (ref 94–109)
CO2 SERPL-SCNC: 33 MMOL/L (ref 20–32)
CREAT SERPL-MCNC: 0.83 MG/DL (ref 0.52–1.04)
GFR SERPL CREATININE-BSD FRML MDRD: 75 ML/MIN/1.7M2
GLUCOSE SERPL-MCNC: 79 MG/DL (ref 70–99)
POTASSIUM SERPL-SCNC: 4 MMOL/L (ref 3.4–5.3)
SODIUM SERPL-SCNC: 140 MMOL/L (ref 133–144)

## 2017-02-06 PROCEDURE — 36415 COLL VENOUS BLD VENIPUNCTURE: CPT | Performed by: PHYSICIAN ASSISTANT

## 2017-02-06 PROCEDURE — 99214 OFFICE O/P EST MOD 30 MIN: CPT | Performed by: FAMILY MEDICINE

## 2017-02-06 PROCEDURE — 80048 BASIC METABOLIC PNL TOTAL CA: CPT | Performed by: PHYSICIAN ASSISTANT

## 2017-02-06 RX ORDER — VENLAFAXINE HYDROCHLORIDE 37.5 MG/1
CAPSULE, EXTENDED RELEASE ORAL
Qty: 46 CAPSULE | Refills: 0 | Status: CANCELLED | OUTPATIENT
Start: 2017-02-06

## 2017-02-06 RX ORDER — PREGABALIN 100 MG/1
CAPSULE ORAL
Qty: 270 CAPSULE | Refills: 0 | Status: SHIPPED | OUTPATIENT
Start: 2017-02-06 | End: 2017-05-22

## 2017-02-06 RX ORDER — VENLAFAXINE HYDROCHLORIDE 150 MG/1
150 CAPSULE, EXTENDED RELEASE ORAL DAILY
Qty: 90 CAPSULE | Refills: 0 | Status: SHIPPED | OUTPATIENT
Start: 2017-02-06 | End: 2017-05-03

## 2017-02-06 RX ORDER — VENLAFAXINE HYDROCHLORIDE 37.5 MG/1
CAPSULE, EXTENDED RELEASE ORAL
Qty: 46 CAPSULE | Refills: 0 | OUTPATIENT
Start: 2017-02-06

## 2017-02-06 ASSESSMENT — ANXIETY QUESTIONNAIRES
GAD7 TOTAL SCORE: 21
6. BECOMING EASILY ANNOYED OR IRRITABLE: NEARLY EVERY DAY
5. BEING SO RESTLESS THAT IT IS HARD TO SIT STILL: NEARLY EVERY DAY
1. FEELING NERVOUS, ANXIOUS, OR ON EDGE: NEARLY EVERY DAY
7. FEELING AFRAID AS IF SOMETHING AWFUL MIGHT HAPPEN: NEARLY EVERY DAY
3. WORRYING TOO MUCH ABOUT DIFFERENT THINGS: NEARLY EVERY DAY
2. NOT BEING ABLE TO STOP OR CONTROL WORRYING: NEARLY EVERY DAY
IF YOU CHECKED OFF ANY PROBLEMS ON THIS QUESTIONNAIRE, HOW DIFFICULT HAVE THESE PROBLEMS MADE IT FOR YOU TO DO YOUR WORK, TAKE CARE OF THINGS AT HOME, OR GET ALONG WITH OTHER PEOPLE: EXTREMELY DIFFICULT

## 2017-02-06 ASSESSMENT — PATIENT HEALTH QUESTIONNAIRE - PHQ9: 5. POOR APPETITE OR OVEREATING: NEARLY EVERY DAY

## 2017-02-06 NOTE — PATIENT INSTRUCTIONS
Referral for PHYSICAL THERAPY.    Refill medications.  Increase the Effexor to 150 mg after current supply is gone.  Continue to attempt to get follow up appointment with Dr. Khan.    Call for additional refills when needed.

## 2017-02-06 NOTE — NURSING NOTE
"Chief Complaint   Patient presents with     Recheck Medication       Initial /82 mmHg  Pulse 73  Temp(Src) 98.2  F (36.8  C) (Oral)  Ht 1.626 m (5' 4\")  Wt 55.339 kg (122 lb)  BMI 20.93 kg/m2  SpO2 100% Estimated body mass index is 20.93 kg/(m^2) as calculated from the following:    Height as of this encounter: 1.626 m (5' 4\").    Weight as of this encounter: 55.339 kg (122 lb).  Medication Reconciliation: complete     Siva Denise CMA      "

## 2017-02-06 NOTE — MR AVS SNAPSHOT
After Visit Summary   2/6/2017    Sailaja Garcias    MRN: [id number]           Patient Information     Date Of Birth          1975        Visit Information        Provider Department      2/6/2017 10:20 AM Odalys Mcgee MD [facility]        Today's Diagnoses     Bipolar 2 disorder (H)    -  1     Moderate recurrent major depression (H)         Fibromyalgia         Chronic pain syndrome         Chronic midline low back pain without sciatica           Care Instructions    Referral for PHYSICAL THERAPY.    Refill medications.  Increase the Effexor to 150 mg after current supply is gone.  Continue to attempt to get follow up appointment with Dr. Khan.    Call for additional refills when needed.          Follow-ups after your visit        Additional Services     [facility] PT, HAND, AND CHIROPRACTIC REFERRAL       **This order will print in the [facility] Scheduling Office**    Physical Therapy, Hand Therapy and Chiropractic Care are available through:    *[facility]  *[facility]  *[facility]    Call one number to schedule at any of the above locations: [phone].    Your provider has referred you to: Physical Therapy at [facility] or [facility]    Indication/Reason for Referral: Low Back Pain, Wrist Pain and fibromyalgia pain  Onset of Illness: years  Therapy Orders: Evaluate and Treat  Special Programs: None  Special Request: None    Frank Marie      Additional Comments for the Therapist or Chiropractor: HEP, strengthening, conditioning    Please be aware that coverage of these services is subject to the terms and limitations of your health insurance plan.  Call member services at your health plan with any benefit or coverage questions.      Please bring the following to your appointment:    *Your personal calendar for scheduling future appointments  *Comfortable clothing                  Who to contact     If you have questions or need  "follow up information about today's clinic visit or your schedule please contact [facility] directly at [phone].  Normal or non-critical lab and imaging results will be communicated to you by [employer]hart, letter or phone within 4 business days after the clinic has received the results. If you do not hear from us within 7 days, please contact the clinic through [employer]hart or phone. If you have a critical or abnormal lab result, we will notify you by phone as soon as possible.  Submit refill requests through [employer] or call your pharmacy and they will forward the refill request to us. Please allow 3 business days for your refill to be completed.          Additional Information About Your Visit        [employer] Information     [employer] gives you secure access to your electronic health record. If you see a primary care provider, you can also send messages to your care team and make appointments. If you have questions, please call your primary care clinic.  If you do not have a primary care provider, please call [phone] and they will assist you.        Care EveryWhere ID     This is your Care EveryWhere ID. This could be used by other organizations to access your [facility] medical records  [id number]        Your Vitals Were     Pulse Temperature Height BMI (Body Mass Index) Pulse Oximetry       73 98.2  F (36.8  C) (Oral) 1.626 m (5' 4\") 20.93 kg/m2 100%        Blood Pressure from Last 3 Encounters:   02/06/17 122/82   01/19/17 108/75   01/06/17 106/70    Weight from Last 3 Encounters:   02/06/17 55.339 kg (122 lb)   01/19/17 54.568 kg (120 lb 4.8 oz)   01/06/17 54.069 kg (119 lb 3.2 oz)              We Performed the Following     [facility] PT, HAND, AND CHIROPRACTIC REFERRAL          Today's Medication Changes          These changes are accurate as of: 2/6/17 11:12 AM.  If you have any questions, ask your nurse or doctor.               These medicines have changed or have updated prescriptions.        " Dose/Directions    pregabalin 100 MG capsule   Commonly known as:  LYRICA   This may have changed:  See the new instructions.   Used for:  Fibromyalgia   Changed by:  Odalys Mcgee MD        TAKE ONE CAPSULE BY MOUTH THREE TIMES DAILY   Quantity:  270 capsule   Refills:  0       venlafaxine 150 MG 24 hr capsule   Commonly known as:  EFFEXOR-XR   This may have changed:    - medication strength  - how much to take  - how to take this  - when to take this  - additional instructions   Used for:  Moderate recurrent major depression (H)   Changed by:  Odalys Mcgee MD        Dose:  150 mg   Take 1 capsule (150 mg) by mouth daily   Quantity:  90 capsule   Refills:  0            Where to get your medicines      These [id number] [address] [facility] [address]  [address], [facility] [address]     Phone:  [phone]    - venlafaxine 150 MG 24 hr capsule      Some of these will need a paper prescription and others can be bought over the counter.  Ask your nurse if you have questions.     Bring a paper prescription for each of these medications    - pregabalin 100 MG capsule             Primary Care Provider Office Phone # Fax #    Odalys Mcgee [phone]       [address]  [facility] [address]        Thank you!     Thank you for choosing [facility]  for your care. Our goal is always to provide you with excellent care. Hearing back from our patients is one way we can continue to improve our services. Please take a few minutes to complete the written survey that you may receive in the mail after your visit with us. Thank you!             Your Updated Medication List - Protect others around you: Learn how to safely use, store and throw away your medicines at www.disposemymeds.org.          This list is accurate as of: 2/6/17 11:12 AM.  Always use your most recent med list.                    Brand Name Dispense Instructions for use    cholecalciferol 5000 UNITS Caps capsule    vitamin D3    100 capsule    Take 1 capsule (5,000 Units) by mouth daily Take 1 per day       clonazePAM 1 MG tablet    klonoPIN    30 tablet    TAKE 1/2 TO 1 TABLET BY MOUTH TWO TIMES A DAY AS NEEDED FOR ANXIETY       hydrOXYzine 25 MG capsule    VISTARIL    120 capsule    TAKE 2 CAPSULES BY MOUTH 3 TIMES DAILY AS NEEDED FOR ITCHING OR ANXIETY.       IBUPROFEN PO      Take 800 mg by mouth as needed for moderate pain       * lamoTRIgine 200 MG tablet    LaMICtal    60 tablet    Take 2 per day by mouth (400 mg per day)       * lamoTRIgine 100 MG tablet    LAMICTAL    60 tablet    Take 1 1/2 tablets per day for one week then 1 tab twice a day NEEDS FOLLOW UP WITH PSYCHIATRY PRIOR TO ADDITIONAL REFILLS       lidocaine 5 % Patch    LIDODERM         multivitamin, therapeutic with minerals Tabs tablet      Take 1 tablet by mouth daily       naltrexone 50 MG tablet    DEPADE;REVIA    30 tablet    Take 1 per day       naproxen 500 MG tablet    NAPROSYN    60 tablet    TAKE 1 TABLET (500 MG) BY MOUTH 2 TIMES DAILY AS NEEDED FOR MODERATE PAIN       nortriptyline 10 MG capsule    PAMELOR    60 capsule    TAKE 2 CAPSULES (20 MG) BY MOUTH AT BEDTIME       potassium chloride 10 MEQ tablet    K-TAB,KLOR-CON    60 tablet    Take 2 tablets (20 mEq) by mouth daily       prazosin 1 MG capsule    MINIPRESS    90 capsule    Take 2-3 capsules (2-3 mg) by mouth At Bedtime       pregabalin 100 MG capsule    LYRICA    270 capsule    TAKE ONE CAPSULE BY MOUTH THREE TIMES DAILY       tazarotene 0.05 % Crea cream      Apply to face, back and chest once daily       tiZANidine 4 MG tablet    ZANAFLEX    90 tablet    TAKE ONE TABLET BY MOUTH THREE TIMES DAILY AS NEEDED for muscle spasm       valACYclovir 1000 mg tablet    VALTREX    4 tablet    Take 2 tablets (2,000 mg) by mouth 2 times daily       venlafaxine 150 MG 24 hr capsule     EFFEXOR-XR    90 capsule    Take 1 capsule (150 mg) by mouth daily       VOLTAREN 1 % Gel topical gel   Generic drug:  diclofenac          * Notice:  This list has 2 medication(s) that are the same as other medications prescribed for you. Read the directions carefully, and ask your doctor or other care provider to review them with you.

## 2017-02-06 NOTE — PROGRESS NOTES
SUBJECTIVE:                                                    Sailaja Garicas is a 41 year old female who presents to clinic today for the following health issues:        Depression and Anxiety Follow-Up    Status since last visit: Up and down from day to day    Other associated symptoms:None    Complicating factors:     Significant life event: Yes-  Court with ex     Current substance abuse: None    PHQ-9 SCORE 6/20/2016 1/6/2017 2/6/2017   Total Score - - -   Total Score 24 21 23     ANA LAURA-7 SCORE 4/7/2016 1/6/2017 2/6/2017   Total Score - - -   Total Score 20 21 21     Was off her medication for a month due to loss of insurance but is getting back on her regular medications now - working up to previous doses and noting gradual improvement.       PHQ-9  English      PHQ-9   Any Language     GAD7         Chronic Pain Follow-Up       Type / Location of Pain: All joints, back - fibromyalgia and chronic low back pain.    Analgesia/pain control:       Recent changes:  Worse then last time pt was seen because she did go off of meds - lyrica starting back on now.      Overall control: Tolerable with discomfort  Activity level/function:      Daily activities:  Able to do light housework, cooking    Work:  Unable to work  Adverse effects:  No  Adherance    Taking medication as directed?  Yes    Participating in other treatments: no, pt would like to start PT again.  Risk Factors:    Sleep:  Poor, unable to sleep more than 2 hours at a time. Too uncomfortble    Mood/anxiety:  worsened    Recent family or social stressors: moved back with mom, court with ex    Other aggravating factors: prolonged sitting and prolonged standing  PHQ-9 SCORE 6/20/2016 1/6/2017 2/6/2017   Total Score - - -   Total Score 24 21 23     ANA LAURA-7 SCORE 4/7/2016 1/6/2017 2/6/2017   Total Score - - -   Total Score 20 21 21     Encounter-Level CSA:     There are no encounter-level csa.             Amount of exercise or physical activity:  "None    Problems taking medications regularly: No    Medication side effects: none    Diet: regular (no restrictions)    Decreased GFR -  Jan and previous June low GFR in high 50's.            Problem list and histories reviewed & adjusted, as indicated.  Additional history: as documented    Problem list, Medication list, Allergies, and Medical/Social/Surgical histories reviewed in [facility] and updated as appropriate.    ROS:  Constitutional, HEENT, cardiovascular, pulmonary, gi and gu systems are negative, except as otherwise noted.    OBJECTIVE:                                                    /82 mmHg  Pulse 73  Temp(Src) 98.2  F (36.8  C) (Oral)  Ht 1.626 m (5' 4\")  Wt 55.339 kg (122 lb)  BMI 20.93 kg/m2  SpO2 100%  Body mass index is 20.93 kg/(m^2).  GENERAL: alert, no distress and fatigued  NECK: no adenopathy, no asymmetry, masses, or scars and thyroid normal to palpation  RESP: lungs clear to auscultation - no rales, rhonchi or wheezes  CV: regular rate and rhythm, normal S1 S2, no S3 or S4, no murmur, click or rub, no peripheral edema and peripheral pulses strong  ABDOMEN: soft, nontender, no hepatosplenomegaly, no masses and bowel sounds normal  MS: multiple soft tissue tender spots noted: lateral epicondyles, greater trochanters, biceps tendons, paravertebral muscles of Cervical and upper thoracic spine, medial tibial plateau  Knee braces bilaterally.  Tenderness paravertebral muscles lumbar spine.    SKIN: no suspicious lesions or rashes  PSYCH: mentation appears normal, affect flat, fatigued, judgement and insight intact and appearance well groomed    Diagnostic Test Results:  Results for orders placed or performed in visit on 02/06/17   Basic metabolic panel   Result Value Ref Range    Sodium 140 133 - 144 mmol/L    Potassium 4.0 3.4 - 5.3 mmol/L    Chloride 100 94 - 109 mmol/L    Carbon Dioxide 33 (H) 20 - 32 mmol/L    Anion Gap 7 3 - 14 mmol/L    Glucose 79 70 - 99 mg/dL    Urea Nitrogen 6 " (L) 7 - 30 mg/dL    Creatinine 0.83 0.52 - 1.04 mg/dL    GFR Estimate 75 >60 mL/min/1.7m2    GFR Estimate If Black >90   GFR Calc   >60 mL/min/1.7m2    Calcium 9.7 8.5 - 10.1 mg/dL        ASSESSMENT/PLAN:                                                            1. Bipolar 2 disorder (H)  2. Moderate recurrent major depression (H)  Care with Psych planned.  Has phone call out for scheduling of appointment.  Increase the effexor back to 150 mg.  Continue the lamictal with increase back to 400 mg daily.  - venlafaxine (EFFEXOR-XR) 150 MG 24 hr capsule; Take 1 capsule (150 mg) by mouth daily  Dispense: 90 capsule; Refill: 0    3. Fibromyalgia  Getting back on maintenance dose of lyrica.  PHYSICAL THERAPY recommended.  - pregabalin (LYRICA) 100 MG capsule; TAKE ONE CAPSULE BY MOUTH THREE TIMES DAILY  Dispense: 270 capsule; Refill: 0  - [facility] PT, HAND, AND CHIROPRACTIC REFERRAL    4. Chronic pain syndrome  - [facility] PT, HAND, AND CHIROPRACTIC REFERRAL    5. Chronic midline low back pain without sciatica  - [facility] PT, HAND, AND CHIROPRACTIC REFERRAL    6. Hypokalemia  Normal now.  - Basic metabolic panel    7. Decreased GFR  Normal now.  - Basic metabolic panel    Patient Instructions   Referral for PHYSICAL THERAPY.    Refill medications.  Increase the Effexor to 150 mg after current supply is gone.  Continue to attempt to get follow up appointment with Dr. Khan.    Call for additional refills when needed.          Odalys Mcgee MD  [facility]

## 2017-02-07 ASSESSMENT — PATIENT HEALTH QUESTIONNAIRE - PHQ9: SUM OF ALL RESPONSES TO PHQ QUESTIONS 1-9: 23

## 2017-02-07 ASSESSMENT — ANXIETY QUESTIONNAIRES: GAD7 TOTAL SCORE: 21

## 2017-02-07 NOTE — PROGRESS NOTES
Quick Note:    Jostin Menard  Your electrolytes, blood sugar and kidney function were normal.   Please call or MyChart my office with any questions or concerns.   Mariana Brantley, PAC            ______

## 2017-02-28 DIAGNOSIS — F41.1 GENERALIZED ANXIETY DISORDER: ICD-10-CM

## 2017-02-28 RX ORDER — CLONAZEPAM 1 MG/1
TABLET ORAL
Qty: 30 TABLET | Refills: 0 | Status: SHIPPED | OUTPATIENT
Start: 2017-02-28 | End: 2017-03-23

## 2017-02-28 NOTE — TELEPHONE ENCOUNTER
clonazePAM      Last Written Prescription Date:  01/6/17  Last Fill Quantity: 30,   # refills: 0  Last Office Visit with [facility], [facility] or [facility] prescribing provider: 02/6/17 Dr. Mcgee    Future Office visit:       Routing refill request to provider for review/approval because:  Drug not on the [facility], [facility] or  [facility] refill protocol or controlled substance    NINA Jack)

## 2017-03-07 DIAGNOSIS — F31.81 BIPOLAR II DISORDER (H): ICD-10-CM

## 2017-03-07 NOTE — TELEPHONE ENCOUNTER
lamoTRIgine     Last Written Prescription Date: 01/31/17  Last Fill [id number],   # refills: 0  Last Office Visit with [facility] prescribing provider: 02/05/17 Dr. Mcgee    Future Office visit:       Routing refill request to provider for review/approval because:  Drug not on the [facility] or [facility] refill protocol or controlled substance    Elvira Owusu

## 2017-03-08 RX ORDER — LAMOTRIGINE 200 MG/1
TABLET ORAL
Qty: 60 TABLET | Refills: 5 | Status: SHIPPED | OUTPATIENT
Start: 2017-03-08

## 2017-03-09 ENCOUNTER — TELEPHONE (OUTPATIENT)
Dept: PSYCHIATRY | Facility: CLINIC | Age: 42
End: 2017-03-09

## 2017-03-09 NOTE — TELEPHONE ENCOUNTER
Client needs Care Coordination to be set up, she wants to come back but no manager was available at the time of her call.  Please call her at [phone]. Thank you

## 2017-03-10 NOTE — TELEPHONE ENCOUNTER
Reached out to Sailaja Garcias. She acknowledged missing appointments. Pt reported she did not have insurance and she also did not have a ride. Pt now reported she has insurance and has a ride program with this. She would like to come in to discuss medications as she's not feeling that they're working well and would like to have a med check with Dr. Khan. Transferred to [facility] to schedule.

## 2017-03-13 ENCOUNTER — TELEPHONE (OUTPATIENT)
Dept: FAMILY MEDICINE | Facility: CLINIC | Age: 42
End: 2017-03-13

## 2017-03-13 NOTE — TELEPHONE ENCOUNTER
..Reason for Call:  Sending in form or dropping it off or possibly fax in    Detailed comments: this is regarding seizures, ok to drive    Phone Number Patient can be reached at: Home number on file [phone] (home)    Best Time: anytime    Can we leave a detailed message on this number? YES    Call taken on 3/13/2017 at 10:45 AM by Nuzhat Rockwell

## 2017-03-15 DIAGNOSIS — F10.20 UNCOMPLICATED ALCOHOL DEPENDENCE (H): ICD-10-CM

## 2017-03-15 NOTE — TELEPHONE ENCOUNTER
Refill request for Naltrexone. This is not on the RN refill protocol.    Patient has not been seen since 08/24/2016.  She no showed her last two visits. After speaking to clinic management, she has been allowed reschedule with Dr. Khan on 03/22/2017.        Will route to provider for refill review and authorization if appropriate.

## 2017-03-16 ENCOUNTER — TELEPHONE (OUTPATIENT)
Dept: FAMILY MEDICINE | Facility: CLINIC | Age: 42
End: 2017-03-16

## 2017-03-16 RX ORDER — NALTREXONE HYDROCHLORIDE 50 MG/1
TABLET, FILM COATED ORAL
Qty: 30 TABLET | Refills: 0 | Status: SHIPPED | OUTPATIENT
Start: 2017-03-16 | End: 2017-03-22

## 2017-03-16 NOTE — TELEPHONE ENCOUNTER
DRIVING FORM WHEN DONE MAIL TO,  [facility],  EVALUATION UNIT, [address] [place] [id number], MAY FAX ALSO [phone]  IF QUSTIND CALL [phone]
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
## 2017-03-17 NOTE — TELEPHONE ENCOUNTER
Bro Prieto contacted Sailaja on 03/17/17 and left a message. If patient calls back please contact care team (or ask questions below)    Will Ida Maldonado
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
## 2017-03-22 ENCOUNTER — OFFICE VISIT (OUTPATIENT)
Dept: PSYCHIATRY | Facility: CLINIC | Age: 42
End: 2017-03-22
Payer: COMMERCIAL

## 2017-03-22 DIAGNOSIS — F10.20 UNCOMPLICATED ALCOHOL DEPENDENCE (H): ICD-10-CM

## 2017-03-22 DIAGNOSIS — F41.1 GENERALIZED ANXIETY DISORDER: Primary | ICD-10-CM

## 2017-03-22 PROCEDURE — 99214 OFFICE O/P EST MOD 30 MIN: CPT | Performed by: PSYCHIATRY & NEUROLOGY

## 2017-03-22 RX ORDER — NALTREXONE HYDROCHLORIDE 50 MG/1
TABLET, FILM COATED ORAL
Qty: 30 TABLET | Refills: 4 | Status: SHIPPED | OUTPATIENT
Start: 2017-03-22 | End: 2017-08-18

## 2017-03-22 ASSESSMENT — ANXIETY QUESTIONNAIRES
7. FEELING AFRAID AS IF SOMETHING AWFUL MIGHT HAPPEN: NEARLY EVERY DAY
6. BECOMING EASILY ANNOYED OR IRRITABLE: NEARLY EVERY DAY
3. WORRYING TOO MUCH ABOUT DIFFERENT THINGS: NEARLY EVERY DAY
2. NOT BEING ABLE TO STOP OR CONTROL WORRYING: NEARLY EVERY DAY
1. FEELING NERVOUS, ANXIOUS, OR ON EDGE: NEARLY EVERY DAY
5. BEING SO RESTLESS THAT IT IS HARD TO SIT STILL: MORE THAN HALF THE DAYS
GAD7 TOTAL SCORE: 20

## 2017-03-22 ASSESSMENT — PATIENT HEALTH QUESTIONNAIRE - PHQ9: 5. POOR APPETITE OR OVEREATING: NEARLY EVERY DAY

## 2017-03-22 NOTE — PROGRESS NOTES
Psychiatric Progress Note    Name: Sailaja Garcias  Date: March 22, 2017   Length of Visit: 30 minutes  MRN: [id number]      Current Outpatient Prescriptions   Medication Sig     naltrexone (DEPADE;REVIA) 50 MG tablet Take 1 per day     nortriptyline (PAMELOR) 10 MG capsule TAKE 2 CAPSULES (20 MG) BY MOUTH AT BEDTIME     lamoTRIgine (LAMICTAL) 200 MG tablet Take 2 per day by mouth (400 mg per day)     clonazePAM (KLONOPIN) 1 MG tablet TAKE 1/2 TO 1 TABLET BY MOUTH TWO TIMES A DAY AS NEEDED FOR ANXIETY     pregabalin (LYRICA) 100 MG capsule TAKE ONE CAPSULE BY MOUTH THREE TIMES DAILY     venlafaxine (EFFEXOR-XR) 150 MG 24 hr capsule Take 1 capsule (150 mg) by mouth daily     lamoTRIgine (LAMICTAL) 100 MG tablet Take 1 1/2 tablets per day for one week then 1 tab twice a day  NEEDS FOLLOW UP WITH PSYCHIATRY PRIOR TO ADDITIONAL REFILLS     tiZANidine (ZANAFLEX) 4 MG tablet TAKE ONE TABLET BY MOUTH THREE TIMES DAILY AS NEEDED for muscle spasm     hydrOXYzine (VISTARIL) 25 MG capsule TAKE 2 CAPSULES BY MOUTH 3 TIMES DAILY AS NEEDED FOR ITCHING OR ANXIETY.     cholecalciferol (VITAMIN D3) 5000 UNITS CAPS capsule Take 1 capsule (5,000 Units) by mouth daily Take 1 per day     prazosin (MINIPRESS) 1 MG capsule Take 2-3 capsules (2-3 mg) by mouth At Bedtime     naproxen (NAPROSYN) 500 MG tablet TAKE 1 TABLET (500 MG) BY MOUTH 2 TIMES DAILY AS NEEDED FOR MODERATE PAIN     valACYclovir (VALTREX) 1000 mg tablet Take 2 tablets (2,000 mg) by mouth 2 times daily     VOLTAREN 1 % GEL      lidocaine (LIDODERM) 5 % patch      IBUPROFEN PO Take 800 mg by mouth as needed for moderate pain      multivitamin, therapeutic with minerals (THERA-VIT-M) TABS Take 1 tablet by mouth daily     Tazarotene 0.05 % CREA Apply to face, back and chest once daily     No current facility-administered medications for this visit.        Therapist:  Cecil at [facility], and Agnes at [facility] also     PHQ-9:  21 vs 18 vs 17 vs 18    ANA LAURA-7:  20  "again vs 21 vs 17    Interim History:  (with 2 no shows and many phone calls since then)   Arrived over 20 min late; 'got lost\"   Main stress now is living situation. She and two of her kids are staying with her and her mother.  Not taking prazosin due to insurance problems; probably wasn't helping much anyway.   Taking Zanaflex 4 mg 1/2 BID and 1 HS   I doing Soberlink for 4 months. No alcohol since starting this, was doing it some prior to this.   Second appeal for SSDI coming up.   Still going to court a lot ('s idea), but he is sporadic with child support.       Assessment: from last visit 8/24/16  She remains symptomatic due to situational factors. May try to go off naltrexone soon.   Will need Klonopin ongoing at least for now.   Seems reasonable to try prazosin for nightmares.     Treatment Plan:  Continue Effexor (venlafaxine)  to 225 mg per day and continue with naltrexone 50 mg   Lamictal (lamotrigine) at 400 mg per day is reasonable   Trial of prazosin 1 mg at bedtime for 3 nights, then to 2-3 as tolerated. This is for nightmares.    Ask Dr Mcgee about a referral to [place] [facility]   Use vitamin D; RX done for 5,000   Google \"CBT for panic\" and check out the book \"Mind over Mood\".   Safety plan was reviewed; to the ER as needed or call after hours crisis line; [phone]  Education and counseling was done regarding use of medications, psychotherapy options  Call for a follow up appointment with Dr. Khan in about 3 months; [phone].      Past Medical History:   Diagnosis Date     Alcohol abuse      Borderline personality disorder      Depression      Generalized anxiety disorder      Hx of migraine headaches      PTSD (post-traumatic stress disorder)        10 point ROS was performed and is negative except for diffuse pain, LBP, R knee pain; \"I hurt everywhere\"       Mental Status Assessment:  Appearance:  Well groomed     Behavior/relationship to examiner/demeanor:  Cooperative, " "a reserved   Motor activity:  Normal  Gait:  Normal   Speech:  Normal in volume, articulation, coherence   Mood (subjective report):  \"fair\"   Affect (objective appearance):  Slightly restricted   Thought Process (Associations):  Logical, linear and goal directed  Thought content:  No evidence of suicidal or homicidal ideation,          no overt psychosis and                    patient does not appear to be responding to internal stimuli  Oriented to person, place, date/time   Attention Span and concentration: Intact   Memory:  Short-term memory intact and Long-term memory; Intact  Language:  Fluent   Fund of Knowledge/Intelligence:  Average  Use of language: Intact   Abstraction:  Normal  Insight:  Adequate  Judgment:  Adequate for safety    DSM-5 DIAGNOSIS:  296.89 Bipolar II Disorder Depressed  300.01 (F41.0) Panic Disorder  300.22 (F40.00) Agoraphobia  300.02 (F41.1) Generalized Anxiety Disorder  309.81 (F43.10) Posttraumatic Stress Disorder (includes Posttraumatic Stress Disorder for Children 6 Years and Younger)  Without dissociative symptoms  307.5 (F50.2) Bulimia Nervosa   In partial remission  Severity: Moderate    Possible conversion d/o    Alcohol Use Disorder   303.90 (F10.20) Moderate intermittent    Cluster B traits; Zanarini BPD scale is positive for 8/10 items      Assessment:  Symptoms still relate to situational factors; I can't come up; with a med change which would address this.   Naltrexone seems to be helping, seems reasonable to continue.   RX monitoring [place] ([place]) reviewed:  reviewed- no concerns  [place] profile:  [email or web address]://mnpmp-ph.[employer]/     Treatment Plan:  Continue Effexor (venlafaxine)  to 225 mg per day and continue with naltrexone 50 mg   Lamictal (lamotrigine) at 400 mg per day is reasonable   Continue naltrexone 50 mg per day   Use vitamin D; RX done for 5,000   Google \"CBT for panic\" and check out the book \"Mind over Mood\".   Safety plan was reviewed; to the ER as " needed or call after hours crisis line; [phone]  Education and counseling was done regarding use of medications, psychotherapy options  Follow up with Odalys Mcgee for medication management and refills.                Signed: Ren Khan MD

## 2017-03-22 NOTE — MR AVS SNAPSHOT
"                  MRN:[id number]                      After Visit Summary   3/22/2017    Sailaja Garcias    MRN: [id number]           Visit Information        Provider Department      3/22/2017 12:30 PM Ren Khan MD [facility]        Care Instructions        Treatment Plan:  Continue Effexor (venlafaxine)  to 225 mg per day and continue with naltrexone 50 mg   Lamictal (lamotrigine) at 400 mg per day is reasonable   Continue naltrexone 50 mg per day   Use vitamin D; RX done for 5,000   Google \"CBT for panic\" and check out the book \"Mind over Mood\".   Safety plan was reviewed; to the ER as needed or call after hours crisis line; [phone]  Education and counseling was done regarding use of medications, psychotherapy options  Follow up with Odalys Mcgee for medication management and refills.             [employer] Information     [employer] gives you secure access to your electronic health record. If you see a primary care provider, you can also send messages to your care team and make appointments. If you have questions, please call your primary care clinic.  If you do not have a primary care provider, please call [phone] and they will assist you.        Care EveryWhere ID     This is your Care EveryWhere ID. This could be used by other organizations to access your [place] medical records  [id number]        "

## 2017-03-22 NOTE — PATIENT INSTRUCTIONS
"    Treatment Plan:  Continue Effexor (venlafaxine)  to 225 mg per day and continue with naltrexone 50 mg   Lamictal (lamotrigine) at 400 mg per day is reasonable   Continue naltrexone 50 mg per day   Use vitamin D; RX done for 5,000   Google \"CBT for panic\" and check out the book \"Mind over Mood\".   Safety plan was reviewed; to the ER as needed or call after hours crisis line; [phone]  Education and counseling was done regarding use of medications, psychotherapy options  Follow up with Odalys Mcgee for medication management and refills.        "

## 2017-03-23 DIAGNOSIS — M79.7 FIBROMYALGIA: ICD-10-CM

## 2017-03-23 DIAGNOSIS — G89.4 CHRONIC PAIN SYNDROME: ICD-10-CM

## 2017-03-23 DIAGNOSIS — F41.1 GENERALIZED ANXIETY DISORDER: ICD-10-CM

## 2017-03-23 RX ORDER — HYDROXYZINE PAMOATE 25 MG/1
CAPSULE ORAL
Qty: 120 CAPSULE | Refills: 0 | Status: SHIPPED | OUTPATIENT
Start: 2017-03-23 | End: 2017-05-22

## 2017-03-23 ASSESSMENT — PATIENT HEALTH QUESTIONNAIRE - PHQ9: SUM OF ALL RESPONSES TO PHQ QUESTIONS 1-9: 21

## 2017-03-23 ASSESSMENT — ANXIETY QUESTIONNAIRES: GAD7 TOTAL SCORE: 20

## 2017-03-23 NOTE — TELEPHONE ENCOUNTER
hydrOXYzine (VISTARIL) 25 MG capsule      Last Written Prescription Date: 1/6/17  Last Fill Quantity: 120 capsules,  # refills: 0   Last Office Visit with [facility] or [facility] prescribing provider: 3/21/17 Mariana Brantley      tiZANidine (ZANAFLEX) 4 MG tablet      Last Written Prescription Date:  1/16/17  Last Fill Quantity: 90 tablets,   # refills: 0  Last Office Visit with [facility] or [facility] prescribing provider: 3/21/17 Mariana Brantley    Future Office visit:       Routing refill request to provider for review/approval because:  Drug not on the [facility], [facility] or [facility] refill protocol or controlled substance      Elvira Owusu

## 2017-03-24 RX ORDER — NAPROXEN 500 MG/1
TABLET ORAL
Qty: 60 TABLET | Refills: 0 | Status: SHIPPED | OUTPATIENT
Start: 2017-03-24 | End: 2017-04-28

## 2017-03-24 RX ORDER — CLONAZEPAM 1 MG/1
TABLET ORAL
Qty: 30 TABLET | Refills: 0 | Status: SHIPPED | OUTPATIENT
Start: 2017-03-24 | End: 2017-07-13

## 2017-03-24 NOTE — TELEPHONE ENCOUNTER
clonazePAM      Last Written Prescription Date:  02/28/17  Last Fill Quantity: 30,   # refills: 0  Last Office Visit with [facility] or [facility] prescribing provider: 02/6/17 Dr. Mcgee    Future Office visit:       Routing refill request to provider for review/approval because:  Drug not on the [facility], [facility] or [facility] refill protocol or controlled substance    Naproxen      Last Written Prescription Date: 12/6/16  Last Quantity: 60, # refills: 0  Last Office Visit with [facility], [facility] or  [facility] prescribing provider: 02/6/17 Dr. Mcgee         Creatinine   Date Value Ref Range Status   02/06/2017 0.83 0.52 - 1.04 mg/dL Final     Lab Results   Component Value Date    AST 20 01/06/2017     Lab Results   Component Value Date    ALT 23 01/06/2017     BP Readings from Last 3 Encounters:   02/06/17 122/82   01/19/17 108/75   01/06/17 106/70

## 2017-03-29 ENCOUNTER — THERAPY VISIT (OUTPATIENT)
Dept: PHYSICAL THERAPY | Facility: CLINIC | Age: 42
End: 2017-03-29
Payer: COMMERCIAL

## 2017-03-29 DIAGNOSIS — M54.50 BILATERAL LOW BACK PAIN WITHOUT SCIATICA: Primary | ICD-10-CM

## 2017-03-29 DIAGNOSIS — M54.2 CERVICALGIA: ICD-10-CM

## 2017-03-29 PROCEDURE — 97112 NEUROMUSCULAR REEDUCATION: CPT | Mod: GP | Performed by: PHYSICAL THERAPIST

## 2017-03-29 PROCEDURE — 97110 THERAPEUTIC EXERCISES: CPT | Mod: GP | Performed by: PHYSICAL THERAPIST

## 2017-03-29 PROCEDURE — 97162 PT EVAL MOD COMPLEX 30 MIN: CPT | Mod: GP | Performed by: PHYSICAL THERAPIST

## 2017-03-29 NOTE — PROGRESS NOTES
Subjective:    Sailaja Garcias is a 41 year old female with a cervical spine condition.  Condition occurred with:  Insidious onset.  Condition occurred: for unknown reasons.  This is a chronic condition  Pt presents to clinic with complaints of chronic cervical and lumbar pain. Pt complains of daily headaches with intense migraines 3-4 days/month. Pt noticing increased stiffness and difficulty looking over shoulder. Pt having most low back pain with prolonged standing and prolonged walking. Pt had MD appointment on 2/6/17 and referred to PT. .    Patient reports pain:  Cervical left side and cervical right side.    Pain is described as aching and sharp    Associated symptoms:  Headache, loss of motion/stiffness and numbness. Pain is worse in the A.M..  Symptoms are exacerbated by looking up or down, rotating head, change of position, sitting and lying down and relieved by ice, heat, NSAID's, analgesics, muscle relaxants and activity/movement.  Since onset symptoms are unchanged.  Special testing: none.  Previous treatment includes chiropractic.  There was no improvement following previous treatment.  General health as reported by patient is good.                  Barriers include:  None as reported by the patient.    Red flags:  None as reported by the patient.      Sailaja Garcias is a 41 year old female with a lumbar condition.  Condition occurred with:  Insidious onset.  Condition occurred: for unknown reasons.  This is a new condition     Patient reports pain:  Lumbar spine right, lumbar spine left, mid lumbar spine and upper lumbar spine.    Pain is described as aching (tightness) and is intermittent and reported as 7/10.  Associated symptoms:  Loss of motion/stiffness and tingling. Pain is worse in the P.M..   and relieved by heat, analgesics, NSAID's and muscle relaxants.  Since onset symptoms are gradually worsening.  Special testing: none.  Previous treatment includes chiropractic.  There was no  improvement following previous treatment.  General health as reported by patient is good.                  Barriers include:  None as reported by the patient.    Red flags:  None as reported by the patient.                        Objective:    Standing Alignment:    Cervical/Thoracic:  Forward head  Shoulder/UE:  Rounded shoulders                  Flexibility/Screens:     Upper Extremity:    Decreased left upper extremity flexibility at:  Pectoralis Major and Pectoralis Minor    Decreased right upper extremity flexibility present at:  Pectoralis Major and Pectoralis Minor    Spine:  Decreased left spine flexibility:  Upper Trap    Decreased right spine flexibility:  Upper Trap             Lumbar/SI Evaluation  ROM:    AROM Lumbar:   Flexion:            To floor -pain  Ext:                    50% +pain   Side Bend:        Left:  75%    Right:  75%  Rotation:           Left:  WNL    Right:  WNL  Side Glide:        Left:     Right:         Strength: poor abdominal strength  Lumbar Myotomes:    T12-L3 (Hip Flex):  Left: 4    Right: 5  L2-4 (Quads):  Left:  5    Right:  5  L4 (Ankle DF):  Left:  5    Right:  5  L5 (Great Toe Ext): Left: 5    Right: 5   S1 (Toe Raise):  Left: 5    Right: 5      Lumbar Dermtomes:  normal                Neural Tension/Mobility:    Left side:  Slump positive.  Left side:SLR or Femoral Nerve  negative.     Right side:   Femoral Nerve; Slump or SLR  negative.   Lumbar Palpation:    Tenderness present at Left:    Quadratus Lumborum; Erector Spinae; Piriformis and PSIS  Tenderness present at Right: Quadratus Lumborum; Erector Spinae; Piriformis and PSIS    Lumbar Provocation:      Left negative with:  PROM hip    Right negative with:  PROM hip         Cervical/Thoracic Evaluation    AROM:  AROM Cervical:    Flexion:            75%  Extension:       50%  Rotation:         Left: 45 deg     Right: 50 deg  Side Bend:      Left: 25 deg     Right:  25 deg      Headaches: cervical  Cervical Myotomes:   normal                      Cervical Dermatomes:  normal                    Cervical Palpation:  : L>R.  Tenderness present at Left:    Upper Trap; Levator; Erector Spinae and Suboccipitals  Tenderness present at Right:    Upper Trap; Levator; Erector Spinae and Suboccipitals    Cervical Stability/Joint Clearing:      Left negative at: TLA LAT    Right negative at:  TLA LAT  Negative:ALAR Ligament and TLA AP                                              [facility] Initial Evaluation    Assessment/Plan:      Patient is a 41 year old female with cervical and lumbar complaints.    Patient has the following significant findings with corresponding treatment plan.                Diagnosis 1:  Low back pain  Pain -  hot/cold therapy, manual therapy, self management, education and home program  Decreased ROM/flexibility - manual therapy, therapeutic exercise, therapeutic activity and home program  Decreased strength - therapeutic exercise, therapeutic activities and home program  Impaired muscle performance - neuro re-education and home program  Decreased function - therapeutic activities and home program  Impaired posture - neuro re-education, therapeutic activities and home program  Diagnosis 2:  Cervical pain   Pain -  hot/cold therapy, manual therapy, self management, education and home program  Decreased ROM/flexibility - manual therapy, therapeutic exercise, therapeutic activity and home program  Decreased strength - therapeutic exercise, therapeutic activities and home program  Impaired muscle performance - neuro re-education and home program  Decreased function - therapeutic activities and home program  Impaired posture - neuro re-education, therapeutic activities and home program    Therapy Evaluation Codes:   1) History comprised of:   Personal factors that impact the plan of care:      Overall behavior pattern and Past/current experiences.    Comorbidity factors that impact  the plan of care are:      Bowel/bladder changes, Cancer, Chemical Dependency, Depression, Dizziness, Fibromyalgia, Migraines/headaches, Pain at night/rest, Rheumatoid arthritis, Seizures, Sleep disorder/apnea and Weakness.     Medications impacting care: Anti-depressant, Anti-inflammatory, Muscle relaxant, Pain and Sleep.  2) Examination of Body Systems comprised of:   Body structures and functions that impact the plan of care:      Cervical spine, Lumbar spine and Thoracic Spine.   Activity limitations that impact the plan of care are:      Bending, Driving, Lifting, Sitting, Walking and Sleeping.  3) Clinical presentation characteristics are:   Evolving/Changing.  4) Decision-Making    Moderate complexity using standardized patient assessment instrument and/or measureable assessment of functional outcome.  Cumulative Therapy Evaluation is: Moderate complexity.    Previous and current functional limitations:  (See Goal Flow Sheet for this information)    Short term and Long term goals: (See Goal Flow Sheet for this information)     Communication ability:  Patient appears to be able to clearly communicate and understand verbal and written communication and follow directions correctly.  Treatment Explanation - The following has been discussed with the patient:   RX ordered/plan of care  Anticipated outcomes  Possible risks and side effects  This patient would benefit from PT intervention to resume normal activities.   Rehab potential is good.    Frequency:  1 X week, once daily  Duration:  for 8 weeks  Discharge Plan:  Achieve all LTG.  Independent in home treatment program.  Return to previous functional level by discharge.  Reach maximal therapeutic benefit.    Please refer to the daily flowsheet for treatment today, total treatment time and time spent performing 1:1 timed codes.

## 2017-03-30 ENCOUNTER — RADIANT APPOINTMENT (OUTPATIENT)
Dept: MAMMOGRAPHY | Facility: CLINIC | Age: 42
End: 2017-03-30
Attending: PHYSICIAN ASSISTANT
Payer: COMMERCIAL

## 2017-03-30 DIAGNOSIS — Z12.31 VISIT FOR SCREENING MAMMOGRAM: ICD-10-CM

## 2017-03-30 PROCEDURE — G0202 SCR MAMMO BI INCL CAD: HCPCS

## 2017-03-30 PROCEDURE — 77063 BREAST TOMOSYNTHESIS BI: CPT

## 2017-03-30 NOTE — PROGRESS NOTES
Subjective:                                       Pertinent medical history includes:  Rheumatoid arthritis, history of fractures, cancer, seizures, chemical dependency, fibromyalgia, depression, migraines, smoking and sleep disorder/apnea.  Medical allergies: no.  Other surgeries include:  Cancer surgery and other.  Current medications:  Anti-seizure medication, sleep medication, anti-inflammatory, pain medication, muscle relaxants and anti-depressants.  Current occupation is Homemaker.                  Oswestry Score: 71.11 %                 Objective:    System    Physical Exam    General     ROS    Assessment/Plan:

## 2017-04-04 ENCOUNTER — TELEPHONE (OUTPATIENT)
Dept: FAMILY MEDICINE | Facility: CLINIC | Age: 42
End: 2017-04-04

## 2017-04-04 ENCOUNTER — THERAPY VISIT (OUTPATIENT)
Dept: PHYSICAL THERAPY | Facility: CLINIC | Age: 42
End: 2017-04-04
Payer: COMMERCIAL

## 2017-04-04 DIAGNOSIS — M54.2 CERVICALGIA: ICD-10-CM

## 2017-04-04 DIAGNOSIS — G89.29 CHRONIC BILATERAL LOW BACK PAIN WITHOUT SCIATICA: ICD-10-CM

## 2017-04-04 DIAGNOSIS — M54.50 CHRONIC BILATERAL LOW BACK PAIN WITHOUT SCIATICA: ICD-10-CM

## 2017-04-04 PROCEDURE — 97110 THERAPEUTIC EXERCISES: CPT | Mod: GP | Performed by: PHYSICAL THERAPIST

## 2017-04-04 PROCEDURE — 97112 NEUROMUSCULAR REEDUCATION: CPT | Mod: GP | Performed by: PHYSICAL THERAPIST

## 2017-04-04 NOTE — TELEPHONE ENCOUNTER
Prazosin 1 mg cap not covered under insurance. Please advise alternative or PA. Not currently on pt med list.      Siva Denise, CMA

## 2017-04-04 NOTE — PROGRESS NOTES
Jostin Menard  Your mammogram was normal.  I do recommend annual mammograms.  Please call or MyChart my office with any questions or concerns.    Mariana Brantley, PAC

## 2017-04-04 NOTE — TELEPHONE ENCOUNTER
Bro Prieto contacted [facility] on 04/04/17 and left a message. If patient calls back please verify it pt felt like the prazosin was effective against nightmares    Will Ida HERNANDEZ  .

## 2017-04-04 NOTE — TELEPHONE ENCOUNTER
Call patient re:  Did she think the medication was effective for nightmares?  If so, start PA.  DANIELLE

## 2017-04-05 NOTE — TELEPHONE ENCOUNTER
Bro Prieto contacted [facility] on 04/05/17 and left a message. If patient calls back please verify it pt felt like the prazosin was effective against nightmares    Will Ida HERNANDEZ  .

## 2017-04-10 ENCOUNTER — THERAPY VISIT (OUTPATIENT)
Dept: PHYSICAL THERAPY | Facility: CLINIC | Age: 42
End: 2017-04-10
Payer: COMMERCIAL

## 2017-04-10 DIAGNOSIS — M54.2 CERVICALGIA: ICD-10-CM

## 2017-04-10 DIAGNOSIS — G89.29 CHRONIC BILATERAL LOW BACK PAIN WITHOUT SCIATICA: ICD-10-CM

## 2017-04-10 DIAGNOSIS — M54.50 CHRONIC BILATERAL LOW BACK PAIN WITHOUT SCIATICA: ICD-10-CM

## 2017-04-10 PROCEDURE — 97112 NEUROMUSCULAR REEDUCATION: CPT | Mod: GP | Performed by: PHYSICAL THERAPIST

## 2017-04-10 PROCEDURE — 97110 THERAPEUTIC EXERCISES: CPT | Mod: GP | Performed by: PHYSICAL THERAPIST

## 2017-04-12 NOTE — TELEPHONE ENCOUNTER
Bro Prieto contacted Sailaja on 04/05/17 and left a message. If patient calls back please verify it pt felt like the prazosin was effective against nightmares     Will Ida HERNANDEZ    Pt has not responded to multiple attempts to reach her regarding this. Closing chart - routing to PCP as DARSHAN

## 2017-04-14 ENCOUNTER — THERAPY VISIT (OUTPATIENT)
Dept: PHYSICAL THERAPY | Facility: CLINIC | Age: 42
End: 2017-04-14
Payer: COMMERCIAL

## 2017-04-14 DIAGNOSIS — M54.50 CHRONIC BILATERAL LOW BACK PAIN WITHOUT SCIATICA: ICD-10-CM

## 2017-04-14 DIAGNOSIS — G89.29 CHRONIC BILATERAL LOW BACK PAIN WITHOUT SCIATICA: ICD-10-CM

## 2017-04-14 DIAGNOSIS — M54.2 CERVICALGIA: ICD-10-CM

## 2017-04-14 PROCEDURE — 97110 THERAPEUTIC EXERCISES: CPT | Mod: GP | Performed by: PHYSICAL THERAPIST

## 2017-04-14 PROCEDURE — 97112 NEUROMUSCULAR REEDUCATION: CPT | Mod: GP | Performed by: PHYSICAL THERAPIST

## 2017-04-28 DIAGNOSIS — M79.7 FIBROMYALGIA: ICD-10-CM

## 2017-04-28 DIAGNOSIS — G89.4 CHRONIC PAIN SYNDROME: ICD-10-CM

## 2017-05-01 NOTE — TELEPHONE ENCOUNTER
naproxen (NAPROSYN) 500 MG tablet      Last Written Prescription Date: 3/24/17  Last Quantity: 60, # refills: 0  Last Office Visit with [facility], [facility] or [facility] prescribing provider: 3/24/17 Dr. Mcgee         Creatinine   Date Value Ref Range Status   02/06/2017 0.83 0.52 - 1.04 mg/dL Final     Lab Results   Component Value Date    AST 20 01/06/2017     Lab Results   Component Value Date    ALT 23 01/06/2017     BP Readings from Last 3 Encounters:   02/06/17 122/82   01/19/17 108/75   01/06/17 106/70     Elvira Owusu

## 2017-05-03 DIAGNOSIS — R55 SYNCOPE, UNSPECIFIED SYNCOPE TYPE: ICD-10-CM

## 2017-05-03 DIAGNOSIS — F33.1 MODERATE RECURRENT MAJOR DEPRESSION (H): ICD-10-CM

## 2017-05-03 RX ORDER — NAPROXEN 500 MG/1
TABLET ORAL
Qty: 60 TABLET | Refills: 0 | Status: SHIPPED | OUTPATIENT
Start: 2017-05-03

## 2017-05-03 NOTE — TELEPHONE ENCOUNTER
venlafaxine (EFFEXOR-XR) 150 MG 24 hr capsule    Last Written Prescription Date: 2/6/17  Last Fill Quantity: 90, # refills: 0  Last Office Visit with [facility] or  Health prescribing provider: 3/22/17   Next 5 appointments (look out 90 days)     May 09, 2017 10:20 AM CDT   [facility]    [address] [place] [id number]   [phone]                   BP Readings from Last 3 Encounters:   02/06/17 122/82   01/19/17 108/75   01/06/17 106/70     Pulse: (for Fetzima)  Creatinine   Date Value Ref Range Status   02/06/2017 0.83 0.52 - 1.04 mg/dL Final   ]    Last PHQ-9 score on record=   PHQ-9 SCORE 3/22/2017   Total Score 21     nortriptyline (PAMELOR) 10 MG capsule     Last Written Prescription Date: 3/11/17  Last Fill Quantity: 60, # refills: 1  Last Office Visit with [facility] or  Health prescribing provider: 3/22/17 Dr. Mcgee     Next 5 appointments (look out 90 days)     May 09, 2017 10:20 AM CDT   [facility]    [address] [place] [id number]   [phone]                   BP Readings from Last 3 Encounters:   02/06/17 122/82   01/19/17 108/75   01/06/17 106/70     Last PHQ-9 score on record=   PHQ-9 SCORE 3/22/2017   Total Score 21     Elvira Owusu

## 2017-05-05 NOTE — TELEPHONE ENCOUNTER
Routing refill request to provider for review/approval because:  Drug not on the [facility] refill protocol - associated diagnosis Pamelor   Patient due for f/u.     Krysta Chavez RN

## 2017-05-06 RX ORDER — VENLAFAXINE HYDROCHLORIDE 150 MG/1
CAPSULE, EXTENDED RELEASE ORAL
Qty: 30 CAPSULE | Refills: 0 | Status: SHIPPED | OUTPATIENT
Start: 2017-05-06 | End: 2017-06-22

## 2017-05-06 RX ORDER — NORTRIPTYLINE HCL 10 MG
CAPSULE ORAL
Qty: 60 CAPSULE | Refills: 0 | Status: SHIPPED | OUTPATIENT
Start: 2017-05-06 | End: 2017-06-22

## 2017-05-10 ENCOUNTER — TRANSFERRED RECORDS (OUTPATIENT)
Dept: HEALTH INFORMATION MANAGEMENT | Facility: CLINIC | Age: 42
End: 2017-05-10

## 2017-05-22 DIAGNOSIS — F41.1 GENERALIZED ANXIETY DISORDER: ICD-10-CM

## 2017-05-22 DIAGNOSIS — M79.7 FIBROMYALGIA: ICD-10-CM

## 2017-05-22 DIAGNOSIS — G89.4 CHRONIC PAIN SYNDROME: ICD-10-CM

## 2017-05-22 RX ORDER — HYDROXYZINE PAMOATE 25 MG/1
CAPSULE ORAL
Qty: 120 CAPSULE | Refills: 0 | Status: SHIPPED | OUTPATIENT
Start: 2017-05-22 | End: 2017-06-22

## 2017-05-22 RX ORDER — PREGABALIN 100 MG
CAPSULE ORAL
Qty: 270 CAPSULE | Refills: 0 | Status: SHIPPED | OUTPATIENT
Start: 2017-05-22

## 2017-05-22 NOTE — TELEPHONE ENCOUNTER
tiZANidine (ZANAFLEX) 4 MG tablet      Last Written Prescription Date:  3/23/17  Last Fill Quantity: 90,   # refills: 0  Last Office Visit with [facility] provider: 3/22/17 Dr. Mcgee    Future Office visit:       Routing refill request to provider for review/approval because:  Drug not on the [facility] or [facility] refill protocol or controlled substance      hydrOXYzine (VISTARIL) 25 MG capsule      Last Written Prescription Date:  3/23/17  Last Fill Quantity: 120,   # refills: 0  Last Office Visit with [facility] or [facility] prescribing provider: 3/22/17 Dr. Mcgee    Future Office visit:       Routing refill request to provider for review/approval because:  Drug not on the [facility] or [facility] refill protocol or controlled substance

## 2017-05-22 NOTE — TELEPHONE ENCOUNTER
pregabalin (LYRICA) 100 MG capsule      Last Written Prescription Date:  2/6/17  Last Fill Quantity: 270,   # refills: 0  Last Office Visit with [facility], [facility] or [facility] prescribing provider: 3/22/17 Dr. Davis    Future Office visit:       Routing refill request to provider for review/approval because:  Drug not on the [facility], [facility] or [facility] refill protocol or controlled substance

## 2017-05-23 ENCOUNTER — TELEPHONE (OUTPATIENT)
Dept: FAMILY MEDICINE | Facility: CLINIC | Age: 42
End: 2017-05-23

## 2017-05-25 NOTE — TELEPHONE ENCOUNTER
Reason for Call:  Other prescription    Detailed comments: Need rush on Kyrica all out - sending high priority message    Phone Number Patient can be reached at: Cell number on file:    Telephone Information:   Mobile [phone]       Best Time: any    Can we leave a detailed message on this number? YES    Call taken on 5/25/2017 at 11:04 AM by Dolores Young

## 2017-05-30 NOTE — TELEPHONE ENCOUNTER
..Reason for Call: checking status of prior auth for the lyrica    Detailed comments: pt is concerned as this is very necessary for her to have/will not be able to move at all; informed of the messages below;  Pt is asking for help with this;    Phone Number Patient can be reached at: Home number on file [phone]    Best Time: anytime    Can we leave a detailed message on this number? YES    Call taken on 5/30/2017 at 1:23 PM by Nuzhat Rockwell

## 2017-05-30 NOTE — TELEPHONE ENCOUNTER
Call patient re:  Let her know we refaxed the info again today.  For alternatives - the only option in the interim for symptoms is neurontin (previously caused dizziness/nausea) OR cymbalta - (previously caused suidical thoughts).    If she would like to try a low dose of the neurontin - may get some improvement but will need to watch for side effects.  DANIELLE

## 2017-05-31 NOTE — TELEPHONE ENCOUNTER
Reason for Call:  Other Update    Detailed comments: [facility]Picarro is calling in to update team that the Lyrica medication is approved for transitional fill for 5/31/17 through 8/11/17 because [facility] is out of network provider. Please call Kourtney with any questions. Thanks.     Phone Number Haley Hwang can be reached at: [phone]    Best Time: Anytime     Can we leave a detailed message on this number? YES    Call taken on 5/31/2017 at 12:14 PM by Parish Box

## 2017-05-31 NOTE — TELEPHONE ENCOUNTER
Mail box is FULL - unable to leave VM - please relay info below if pt calls back    Will Ida HERNANDEZ

## 2017-06-03 ENCOUNTER — TRANSFERRED RECORDS (OUTPATIENT)
Dept: HEALTH INFORMATION MANAGEMENT | Facility: CLINIC | Age: 42
End: 2017-06-03

## 2017-06-22 DIAGNOSIS — F41.1 GENERALIZED ANXIETY DISORDER: ICD-10-CM

## 2017-06-22 DIAGNOSIS — F43.10 PTSD (POST-TRAUMATIC STRESS DISORDER): ICD-10-CM

## 2017-06-22 DIAGNOSIS — G89.4 CHRONIC PAIN SYNDROME: ICD-10-CM

## 2017-06-22 DIAGNOSIS — R55 SYNCOPE, UNSPECIFIED SYNCOPE TYPE: ICD-10-CM

## 2017-06-22 DIAGNOSIS — F33.1 MODERATE RECURRENT MAJOR DEPRESSION (H): ICD-10-CM

## 2017-06-22 DIAGNOSIS — M79.7 FIBROMYALGIA: ICD-10-CM

## 2017-06-22 RX ORDER — HYDROXYZINE PAMOATE 25 MG/1
CAPSULE ORAL
Qty: 120 CAPSULE | Refills: 0 | Status: SHIPPED | OUTPATIENT
Start: 2017-06-22

## 2017-06-22 RX ORDER — NORTRIPTYLINE HCL 10 MG
CAPSULE ORAL
Qty: 60 CAPSULE | Refills: 0 | Status: SHIPPED | OUTPATIENT
Start: 2017-06-22 | End: 2017-08-08

## 2017-06-22 RX ORDER — VENLAFAXINE HYDROCHLORIDE 150 MG/1
CAPSULE, EXTENDED RELEASE ORAL
Qty: 30 CAPSULE | Refills: 0 | Status: SHIPPED | OUTPATIENT
Start: 2017-06-22 | End: 2017-08-08

## 2017-06-22 RX ORDER — PRAZOSIN HYDROCHLORIDE 1 MG/1
CAPSULE ORAL
Qty: 90 CAPSULE | Refills: 0 | Status: SHIPPED | OUTPATIENT
Start: 2017-06-22 | End: 2017-08-08

## 2017-06-22 NOTE — TELEPHONE ENCOUNTER
prazosin (MINIPRESS) 1 MG capsule (Discontinued)      Last Written Prescription Date: 1/4/17  Last Fill Quantity: 90, # refills: 0    Last Office Visit with [facility], [facility] or  Health prescribing provider:  2/6/17   Future Office Visit:        BP Readings from Last 3 Encounters:   02/06/17 122/82   01/19/17 108/75   01/06/17 106/70       venlafaxine (EFFEXOR-XR) 150 MG 24 hr capsule    Last Written Prescription Date: 5/6/17  Last Fill Quantity: 30, # refills: 0  Last Office Visit with [facility], [facility] or  Health prescribing provider: 2/6/17        BP Readings from Last 3 Encounters:   02/06/17 122/82   01/19/17 108/75   01/06/17 106/70     Pulse: (for Fetzima)  Creatinine   Date Value Ref Range Status   02/06/2017 0.83 0.52 - 1.04 mg/dL Final   ]    Last PHQ-9 score on record=   PHQ-9 SCORE 3/22/2017   Total Score 21       nortriptyline (PAMELOR) 10 MG capsule     Last Written Prescription Date: 5/6/17  Last Fill Quantity: 60, # refills: 0  Last Office Visit with [facility], [facility] or  Health prescribing provider: 2/6/17        BP Readings from Last 3 Encounters:   02/06/17 122/82   01/19/17 108/75   01/06/17 106/70     Last PHQ-9 score on record=   PHQ-9 SCORE 3/22/2017   Total Score 21         tiZANidine (ZANAFLEX) 4 MG tablet      Last Written Prescription Date:  5/22/17  Last Fill Quantity: 90,   # refills: 0  Last Office Visit with [facility], [facility] or  Health prescribing provider: 2/6/17  Future Office visit:       Routing refill request to provider for review/approval because:  Drug not on the [facility], [facility] or  Health refill protocol or controlled substance      hydrOXYzine (VISTARIL) 25 MG capsule      Last Written Prescription Date:  5/22/17  Last Fill Quantity: 120,   # refills: 0  Last Office Visit with [facility], [facility] or  Health prescribing provider: 2/6/17  Future Office visit:       Routing refill request to provider for review/approval because:  Drug not on the [facility], [facility] or  Health refill protocol or controlled substance

## 2017-07-13 DIAGNOSIS — F41.1 GENERALIZED ANXIETY DISORDER: ICD-10-CM

## 2017-07-13 NOTE — TELEPHONE ENCOUNTER
clonazePAM (KLONOPIN) 1 MG tablet      Last Written Prescription Date:  3/24/17  Last Fill Quantity: 30,   # refills: 0  Last Office Visit with [facility] prescribing provider: 2/6/17  Future Office visit:       Routing refill request to provider for review/approval because:  Drug not on the [facility] or [facility] refill protocol or controlled substance

## 2017-07-14 RX ORDER — CLONAZEPAM 1 MG/1
TABLET ORAL
Qty: 30 TABLET | Refills: 0 | Status: SHIPPED | OUTPATIENT
Start: 2017-07-14 | End: 2017-08-13

## 2017-07-25 ENCOUNTER — TELEPHONE (OUTPATIENT)
Dept: PSYCHIATRY | Facility: CLINIC | Age: 42
End: 2017-07-25

## 2017-07-25 NOTE — TELEPHONE ENCOUNTER
Disability forms rec'd for Pt. She has not been seen since March. Will hold on to the  until she sees Dr. Khan 8/18/17.  Attempted to contact Pt to update her, however her phone was unable to accept calls at this time.

## 2017-08-08 DIAGNOSIS — F33.1 MODERATE RECURRENT MAJOR DEPRESSION (H): ICD-10-CM

## 2017-08-08 DIAGNOSIS — R55 SYNCOPE, UNSPECIFIED SYNCOPE TYPE: ICD-10-CM

## 2017-08-08 DIAGNOSIS — G89.4 CHRONIC PAIN SYNDROME: ICD-10-CM

## 2017-08-08 DIAGNOSIS — F43.10 PTSD (POST-TRAUMATIC STRESS DISORDER): ICD-10-CM

## 2017-08-08 DIAGNOSIS — M79.7 FIBROMYALGIA: ICD-10-CM

## 2017-08-08 PROBLEM — M54.2 CERVICALGIA: Status: RESOLVED | Noted: 2017-03-29 | Resolved: 2017-08-08

## 2017-08-08 PROBLEM — M54.50 BILATERAL LOW BACK PAIN WITHOUT SCIATICA: Status: RESOLVED | Noted: 2017-03-29 | Resolved: 2017-08-08

## 2017-08-08 NOTE — PROGRESS NOTES
Subjective:    HPI                    Objective:    System    Physical Exam    General     ROS    Assessment/Plan:      DISCHARGE REPORT    Progress reporting period is from 3/29/17 to 4/14/17.     SUBJECTIVE  Subjective: Noticing increased neck pain/tightness today.    Current Pain level:  (8/10 cervical, 5-7/10 low back pain)   Initial Pain level: 7/10        ;   ,     Patient has failed to return to therapy so current objective findings are unknown.    OBJECTIVE         ASSESSMENT/PLAN  Updated problem list and treatment plan: Diagnosis 1:  Low back and neck pain  Pain -  hot/cold therapy, manual therapy, self management, education and home program  Decreased ROM/flexibility - manual therapy, therapeutic exercise, therapeutic activity and home program  Decreased strength - therapeutic exercise, therapeutic activities and home program  Impaired muscle performance - neuro re-education and home program  Decreased function - therapeutic activities and home program  Impaired posture - neuro re-education, therapeutic activities and home program  STG/LTGs have been met or progress has been made towards goals:  Unknown, pt has not returned  Assessment of Progress: The patient has not returned to therapy. Current status is unknown.  Self Management Plans:  Patient has been instructed in a home treatment program.  Patient  has been instructed in self management of symptoms.  I have re-evaluated this patient and find that the nature, scope, duration and intensity of the therapy is appropriate for the medical condition of the patient.  Sailaja continues to require the following intervention to meet STG and LTG's: PT  The patient failed to complete scheduled/ordered appointments so current information is unknown.  We will discharge this patient from PT.    Recommendations:  This patient would benefit from further evaluation.    Please refer to the daily flowsheet for treatment today, total treatment time and time spent  performing 1:1 timed codes.

## 2017-08-08 NOTE — TELEPHONE ENCOUNTER
nortriptyline (PAMELOR) 10 MG capsule     Last Written Prescription Date: 6/22/17  Last Fill Quantity: 60, # refills: 0  Last Office Visit with [facility] or  Health prescribing provider: 2/6/17 Dr. Mcgee  Next 5 appointments (look out 90 days)     Aug 18, 2017 10:00 AM CDT   Return Visit with Ren Khan MD   [facility]    [address]  [facility] [id number] [phone]                   BP Readings from Last 3 Encounters:   02/06/17 122/82   01/19/17 108/75   01/06/17 106/70     Last PHQ-9 score on record=   PHQ-9 SCORE 3/22/2017   Total Score -   Total Score 21         venlafaxine (EFFEXOR-XR) 150 MG 24 hr capsule    Last Written Prescription Date: 6/22/17  Last Fill Quantity: 30, # refills: 0  Last Office Visit with [facility], [facility] or [facility] prescribing provider: 2/6/17   Next 5 appointments (look out 90 days)     Aug 18, 2017 10:00 AM CDT   Return Visit with Ren Khan MD   [facility]    [address]  [facility] [id number] [phone]                   BP Readings from Last 3 Encounters:   02/06/17 122/82   01/19/17 108/75   01/06/17 106/70     Pulse: (for Fetzima)  Creatinine   Date Value Ref Range Status   02/06/2017 0.83 0.52 - 1.04 mg/dL Final   ]    Last PHQ-9 score on record=   PHQ-9 SCORE 3/22/2017   Total Score -   Total Score 21       prazosin (MINIPRESS) 1 MG capsule      Last Written Prescription Date: 06/22/17  Last Fill Quantity: 90, # refills: 0    Last Office Visit with [facility], [facility] or  Health prescribing provider:  02/06/17 Dr. Mcgee   Future Office Visit:    Next 5 appointments (look out 90 days)     Aug 18, 2017 10:00 AM CDT   Return Visit with Ren Khan MD   [facility]    [address]  [facility] [id number] [phone]                     BP Readings from Last 3 Encounters:   02/06/17 122/82   01/19/17 108/75   01/06/17 106/70         tiZANidine (ZANAFLEX) 4 MG tablet      Last Written Prescription Date:  6/22/17  Last Fill Quantity: 90,   # refills: 0  Last [facility] Visit [facility]: 2/6/17 Dr. Mcgee  Future Office visit:    Next 5 appointments (look out 90 days)     Aug 18, 2017 10:00 AM CDT   [facility] Visit with Ren Khan MD   [facility]    [address] [facility] [address]   [phone]                   Routing refill request to provider for review/approval because:  Drug not on the [facility] or [facility] refill protocol or controlled substance

## 2017-08-13 DIAGNOSIS — F41.1 GENERALIZED ANXIETY DISORDER: ICD-10-CM

## 2017-08-13 RX ORDER — PRAZOSIN HYDROCHLORIDE 1 MG/1
CAPSULE ORAL
Qty: 90 CAPSULE | Refills: 0 | Status: SHIPPED | OUTPATIENT
Start: 2017-08-13 | End: 2017-08-18 | Stop reason: DRUGHIGH

## 2017-08-13 RX ORDER — VENLAFAXINE HYDROCHLORIDE 150 MG/1
CAPSULE, EXTENDED RELEASE ORAL
Qty: 30 CAPSULE | Refills: 0 | Status: SHIPPED | OUTPATIENT
Start: 2017-08-13 | End: 2017-08-18 | Stop reason: DRUGHIGH

## 2017-08-13 RX ORDER — NORTRIPTYLINE HCL 10 MG
CAPSULE ORAL
Qty: 60 CAPSULE | Refills: 0 | Status: SHIPPED | OUTPATIENT
Start: 2017-08-13

## 2017-08-13 NOTE — TELEPHONE ENCOUNTER
Prescription approved per [facility] [facility].  Monserrat Pineda RN.     Routing refill request to provider for review/approval because:  Drug not on the [facility] refill protocol   PHQ 9:  > 5  Monserrat Pineda RN.

## 2017-08-14 RX ORDER — CLONAZEPAM 1 MG/1
TABLET ORAL
Qty: 30 TABLET | Refills: 0 | Status: SHIPPED | OUTPATIENT
Start: 2017-08-14

## 2017-08-14 NOTE — TELEPHONE ENCOUNTER
clonazePAM (KLONOPIN) 1 MG tablet      Last Written Prescription Date:  7/14/17  Last Fill Quantity: 30,   # refills: 0  Last Office Visit with [facility] provider: 2/6/17  Future Office visit:    Next 5 appointments (look out 90 days)     Aug 18, 2017 10:00 AM CDT   Return Visit with Ren Khan MD   [facility] ([facility]    [address] [facility] [address]   [phone]                   Routing refill request to provider for review/approval because:  Drug not on the [facility], [facility] or [facility] [employer] refill protocol or controlled substance

## 2017-08-15 NOTE — TELEPHONE ENCOUNTER
MA- was this faxed last night?  If not please route to Dr. Mcgee.    Deisy MATAMOROS, Patient Care

## 2017-08-16 NOTE — TELEPHONE ENCOUNTER
Call pharmacy to verify if they received it - it was put in box to fax - call in script if not received there.  DANIELLE

## 2017-08-18 ENCOUNTER — OFFICE VISIT (OUTPATIENT)
Dept: PSYCHIATRY | Facility: CLINIC | Age: 42
End: 2017-08-18
Payer: COMMERCIAL

## 2017-08-18 DIAGNOSIS — F33.1 MODERATE RECURRENT MAJOR DEPRESSION (H): ICD-10-CM

## 2017-08-18 DIAGNOSIS — F43.10 PTSD (POST-TRAUMATIC STRESS DISORDER): Primary | ICD-10-CM

## 2017-08-18 PROCEDURE — 99215 OFFICE O/P EST HI 40 MIN: CPT | Performed by: PSYCHIATRY & NEUROLOGY

## 2017-08-18 RX ORDER — PRAZOSIN HYDROCHLORIDE 2 MG/1
CAPSULE ORAL
Qty: 45 CAPSULE | Refills: 3 | Status: SHIPPED | OUTPATIENT
Start: 2017-08-18

## 2017-08-18 RX ORDER — VENLAFAXINE HYDROCHLORIDE 150 MG/1
CAPSULE, EXTENDED RELEASE ORAL
Qty: 60 CAPSULE | Refills: 5 | Status: SHIPPED | OUTPATIENT
Start: 2017-08-18

## 2017-08-18 RX ORDER — ARIPIPRAZOLE 5 MG/1
TABLET ORAL
Qty: 30 TABLET | Refills: 1 | Status: SHIPPED | OUTPATIENT
Start: 2017-08-18

## 2017-08-18 ASSESSMENT — ANXIETY QUESTIONNAIRES
GAD7 TOTAL SCORE: 21
5. BEING SO RESTLESS THAT IT IS HARD TO SIT STILL: NEARLY EVERY DAY
6. BECOMING EASILY ANNOYED OR IRRITABLE: NEARLY EVERY DAY
2. NOT BEING ABLE TO STOP OR CONTROL WORRYING: NEARLY EVERY DAY
3. WORRYING TOO MUCH ABOUT DIFFERENT THINGS: NEARLY EVERY DAY
1. FEELING NERVOUS, ANXIOUS, OR ON EDGE: NEARLY EVERY DAY
7. FEELING AFRAID AS IF SOMETHING AWFUL MIGHT HAPPEN: NEARLY EVERY DAY

## 2017-08-18 ASSESSMENT — PATIENT HEALTH QUESTIONNAIRE - PHQ9
5. POOR APPETITE OR OVEREATING: NEARLY EVERY DAY
SUM OF ALL RESPONSES TO PHQ QUESTIONS 1-9: 25
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
## 2017-08-18 NOTE — MR AVS SNAPSHOT
"                  MRN:[id number]                      After Visit Summary   8/18/2017    Sailaja Garcias    MRN: [id number]           Visit Information        Provider Department      8/18/2017 10:00 AM Ren Khan MD [facility]        Care Instructions          Treatment Plan:  Increase Effexor (venlafaxine) XR to 150 mg twice a day   Trial of Abilify (aripiprazole) 5 mg per day  Lamictal (lamotrigine) at 400 mg per day is reasonable   Continue prazosin and hydroxyzine      Klonopin (clonazepam) RX per primary provider   Use vitamin D; RX done for 5,000   Google \"CBT for panic\" and check out the book \"Mind over Mood\".   Safety plan was reviewed; to the ER as needed or call after hours crisis line; [phone]  Education and counseling was done regarding use of medications, psychotherapy options  Follow up with Odalys Mcgee for medication management and refills.               GetAFive Information     Get[employer] gives you secure access to your electronic health record. If you see a primary care provider, you can also send messages to your care team and make appointments. If you have questions, please call your primary care clinic.  If you do not have a primary care provider, please call [phone] and they will assist you.        Care EveryWhere ID     This is your Care EveryWhere ID. This could be used by other organizations to access your [place] medical records  [id number]        Equal Access to Services     TUCKER PEREZ : Pina Aaron, waaxda luqadaha, qaybta kaaljana moreira. So [facility] [phone].    ATENCIÓN: Si habla español, tiene a medrano disposición servicios gratuitos de asistencia lingüística. Guillermo al [phone].    We comply with applicable federal civil rights laws and [place] laws. We do not discriminate on the basis of race, color, national origin, age, disability sex, sexual orientation or gender " identity.

## 2017-08-18 NOTE — PATIENT INSTRUCTIONS
"      Treatment Plan:  Increase Effexor (venlafaxine) XR to 150 mg twice a day   Trial of Abilify (aripiprazole) 5 mg per day  Lamictal (lamotrigine) at 400 mg per day is reasonable   Continue prazosin and hydroxyzine      Klonopin (clonazepam) RX per primary provider   Use vitamin D; RX done for 5,000   Google \"CBT for panic\" and check out the book \"Mind over Mood\".   Safety plan was reviewed; to the ER as needed or call after hours crisis line; [phone]  Education and counseling was done regarding use of medications, psychotherapy options  Follow up with Odalys Mcgee for medication management and refills.        "

## 2017-08-19 ASSESSMENT — ANXIETY QUESTIONNAIRES: GAD7 TOTAL SCORE: 21

## 2017-09-02 DIAGNOSIS — F43.10 PTSD (POST-TRAUMATIC STRESS DISORDER): ICD-10-CM

## 2017-09-03 ENCOUNTER — TRANSFERRED RECORDS (OUTPATIENT)
Dept: HEALTH INFORMATION MANAGEMENT | Facility: CLINIC | Age: 42
End: 2017-09-03

## 2017-09-05 RX ORDER — PRAZOSIN HYDROCHLORIDE 1 MG/1
CAPSULE ORAL
Qty: 90 CAPSULE | Refills: 0 | OUTPATIENT
Start: 2017-09-05

## 2017-09-05 NOTE — TELEPHONE ENCOUNTER
prazosin (MINIPRESS) 2 MG capsule      Last Written Prescription Date: 8/18/17  Last Fill Quantity: 45, # refills: 3    Last Office Visit with [facility] or [facility] prescribing provider:  2/6/17 Dr. Mcgee   Future Office Visit:        BP Readings from Last 3 Encounters:   02/06/17 122/82   01/19/17 108/75   01/06/17 106/70

## 2017-09-07 DIAGNOSIS — M79.7 FIBROMYALGIA: ICD-10-CM

## 2017-09-07 DIAGNOSIS — G89.4 CHRONIC PAIN SYNDROME: ICD-10-CM

## 2017-09-07 DIAGNOSIS — F33.1 MODERATE RECURRENT MAJOR DEPRESSION (H): ICD-10-CM

## 2017-09-07 DIAGNOSIS — R55 SYNCOPE, UNSPECIFIED SYNCOPE TYPE: ICD-10-CM

## 2017-09-08 NOTE — TELEPHONE ENCOUNTER
venlafaxine (EFFEXOR-XR) 150 MG 24 hr capsule    Last Written Prescription Date: 8/18/17  Last Fill Quantity: 60, # refills: 5  Last Office Visit with [facility], [facility] or  Health prescribing provider: 2/6/17 Dr. Mcgee        BP Readings from Last 3 Encounters:   02/06/17 122/82   01/19/17 108/75   01/06/17 106/70     Pulse: (for Fetzima)  Creatinine   Date Value Ref Range Status   02/06/2017 0.83 0.52 - 1.04 mg/dL Final   ]    Last PHQ-9 score on record=   PHQ-9 SCORE 8/18/2017   Total Score -   Total Score 25       naproxen (NAPROSYN) 500 MG tablet      Last Written Prescription Date: 5/3/17  Last Quantity: 60, # refills: 0  Last Office Visit with [facility], [facility] or [facility] prescribing provider: 2/6/17 Dr. Mcgee       Creatinine   Date Value Ref Range Status   02/06/2017 0.83 0.52 - 1.04 mg/dL Final     Lab Results   Component Value Date    AST 20 01/06/2017     Lab Results   Component Value Date    ALT 23 01/06/2017     BP Readings from Last 3 Encounters:   02/06/17 122/82   01/19/17 108/75   01/06/17 106/70         tiZANidine (ZANAFLEX) 4 MG tablet      Last Written Prescription Date:  8/13/17  Last Fill Quantity: 90,   # refills: 0  Last Office Visit with [facility], [facility] or [facility] prescribing provider: 2/6/17 Dr. Mcgee  Future Office visit:       Routing refill request to provider for review/approval because:  Drug not on the [facility], [facility] or [facility] refill protocol or controlled substance      nortriptyline (PAMELOR) 10 MG capsule     Last Written Prescription Date: 8/13/17  Last Fill Quantity: 60, # refills: 0  Last Office Visit with [facility], [facility] or [facility] prescribing provider: 2/6/17 Dr. Mcgee        BP Readings from Last 3 Encounters:   02/06/17 122/82   01/19/17 108/75   01/06/17 106/70     Last PHQ-9 score on record=   PHQ-9 SCORE 8/18/2017   Total Score -   Total Score 25

## 2017-09-09 DIAGNOSIS — F41.1 GENERALIZED ANXIETY DISORDER: ICD-10-CM

## 2017-09-12 NOTE — TELEPHONE ENCOUNTER
Routing refill request to provider for review/approval because:  Labs out of range:  PHQ9  Mayra Herrera RN

## 2017-09-14 NOTE — TELEPHONE ENCOUNTER
Called pt.  # is not accepting incoming calls at this time.  Will try again tomorrow      Deisy MATAMOROS, Patient Care

## 2017-09-14 NOTE — TELEPHONE ENCOUNTER
Call patient to set up appointment please.  Verify if she had to change her PCP.  Dr. Khan's note from 8/18/ states changed PCP to Connie SANTOS

## 2017-09-15 NOTE — TELEPHONE ENCOUNTER
Called pt again, phone still not accepting calls.  Will try again next Monday.    Deisy MATAMOROS, Patient Care

## 2017-09-18 NOTE — TELEPHONE ENCOUNTER
Phone not accepting calls.  3rd attempt, with no response.  Please advise.      Deisy MATAMOROS, Patient Care

## 2017-10-02 RX ORDER — HYDROXYZINE PAMOATE 25 MG/1
CAPSULE ORAL
Qty: 120 CAPSULE | Refills: 0 | OUTPATIENT
Start: 2017-10-02

## 2017-10-02 RX ORDER — NAPROXEN 500 MG/1
TABLET ORAL
Qty: 60 TABLET | Refills: 0 | OUTPATIENT
Start: 2017-10-02

## 2017-10-02 RX ORDER — CLONAZEPAM 1 MG/1
TABLET ORAL
Qty: 30 TABLET | Refills: 0 | OUTPATIENT
Start: 2017-10-02

## 2017-10-02 RX ORDER — VENLAFAXINE HYDROCHLORIDE 150 MG/1
CAPSULE, EXTENDED RELEASE ORAL
Qty: 30 CAPSULE | Refills: 0 | OUTPATIENT
Start: 2017-10-02

## 2017-10-02 RX ORDER — NORTRIPTYLINE HCL 10 MG
CAPSULE ORAL
Qty: 60 CAPSULE | Refills: 0 | OUTPATIENT
Start: 2017-10-02

## 2017-10-02 NOTE — TELEPHONE ENCOUNTER
Notation with Dr. Khan's last visit - possible transfer to [facility] follow up needed here for refills.   MARIANK

## 2018-03-22 DIAGNOSIS — F41.1 GENERALIZED ANXIETY DISORDER: ICD-10-CM

## 2018-03-22 DIAGNOSIS — F31.81 BIPOLAR II DISORDER (H): ICD-10-CM

## 2018-03-22 RX ORDER — LAMOTRIGINE 200 MG/1
TABLET ORAL
Qty: 60 TABLET | Refills: 0 | OUTPATIENT
Start: 2018-03-22

## 2018-03-22 RX ORDER — HYDROXYZINE PAMOATE 25 MG/1
CAPSULE ORAL
Qty: 120 CAPSULE | Refills: 0 | OUTPATIENT
Start: 2018-03-22

## 2018-03-22 NOTE — TELEPHONE ENCOUNTER
"Requested Prescriptions   Pending Prescriptions Disp Refills     hydrOXYzine (VISTARIL) 25 MG capsule [Pharmacy Med Name: HYDROXYZINE PAMOATE 25MG CAPSULES] 120 capsule 0     Sig: TAKE 2 CAPSULES BY MOUTH THREE TIMES DAILY AS NEEDED FOR ANXIETY/ITCHING    Antihistamines Protocol Failed    3/22/2018  3:23 PM       Failed - Recent (12 mo) or future (30 days) visit within the authorizing provider's specialty    Patient had office visit in the last 12 months or has a visit in the next 30 days with authorizing provider or within the authorizing provider's specialty.  See \"Patient Info\" tab in inbasket, or \"Choose Columns\" in Meds & Orders section of the refill encounter.           Passed - Patient is age 3 or older    Apply age and/or weight-based dosing for peds patients age 3 and older.    Forward request to provider for patients under the age of 3.          lamoTRIgine (LAMICTAL) 200 MG tablet [Pharmacy Med Name: LAMOTRIGINE 200MG TABLETS] 60 tablet 0     Sig: TAKE 2 TABLET BY MOUTH PER DAY    Anticonvulsants Protocol  Failed    3/22/2018  3:23 PM       Failed - Review Authorizing provider's last note.     Refer to last progress notes: confirm request is for original authorizing provider (cannot be through other providers).         Failed - Recent (6 mo) or future (30 days) visit within the authorizing provider's specialty    Patient had office visit in the last 6 months or has a visit in the next 30 days with authorizing provider or within the authorizing provider's specialty.  See \"Patient Info\" tab in inbasket, or \"Choose Columns\" in Meds & Orders section of the refill encounter.           Passed - No active pregnancy on record       Passed - No positive pregnancy test in last 12 months          "

## 2018-06-02 ENCOUNTER — HEALTH MAINTENANCE LETTER (OUTPATIENT)
Age: 43
End: 2018-06-02

## 2018-09-15 ENCOUNTER — HEALTH MAINTENANCE LETTER (OUTPATIENT)
Age: 43
End: 2018-09-15

## 2019-04-26 ENCOUNTER — OFFICE VISIT (OUTPATIENT)
Dept: FAMILY MEDICINE | Facility: CLINIC | Age: 44
End: 2019-04-26
Payer: COMMERCIAL

## 2019-04-26 VITALS
DIASTOLIC BLOOD PRESSURE: 65 MMHG | OXYGEN SATURATION: 100 % | HEIGHT: 64 IN | SYSTOLIC BLOOD PRESSURE: 97 MMHG | HEART RATE: 67 BPM | WEIGHT: 121 LBS | TEMPERATURE: 97.9 F | BODY MASS INDEX: 20.66 KG/M2

## 2019-04-26 DIAGNOSIS — Z93.0 TRACHEOSTOMY PRESENT (H): ICD-10-CM

## 2019-04-26 DIAGNOSIS — J18.9 ACUTE PNEUMONIA: ICD-10-CM

## 2019-04-26 DIAGNOSIS — Z12.4 SCREENING FOR MALIGNANT NEOPLASM OF CERVIX: ICD-10-CM

## 2019-04-26 DIAGNOSIS — D53.0 ANEMIA DUE TO PROTEIN DEFICIENCY: ICD-10-CM

## 2019-04-26 DIAGNOSIS — N91.1 AMENORRHEA, SECONDARY: Primary | ICD-10-CM

## 2019-04-26 LAB
ALBUMIN SERPL-MCNC: 2.8 G/DL (ref 3.4–5)
ALP SERPL-CCNC: 96 U/L (ref 40–150)
ALT SERPL W P-5'-P-CCNC: 32 U/L (ref 0–50)
ANION GAP SERPL CALCULATED.3IONS-SCNC: 5 MMOL/L (ref 3–14)
AST SERPL W P-5'-P-CCNC: 41 U/L (ref 0–45)
BASOPHILS # BLD AUTO: 0 10E9/L (ref 0–0.2)
BASOPHILS NFR BLD AUTO: 0.5 %
BILIRUB SERPL-MCNC: 0.2 MG/DL (ref 0.2–1.3)
BUN SERPL-MCNC: 13 MG/DL (ref 7–30)
CALCIUM SERPL-MCNC: 9.1 MG/DL (ref 8.5–10.1)
CHLORIDE SERPL-SCNC: 105 MMOL/L (ref 94–109)
CO2 SERPL-SCNC: 30 MMOL/L (ref 20–32)
CREAT SERPL-MCNC: 0.82 MG/DL (ref 0.52–1.04)
DIFFERENTIAL METHOD BLD: ABNORMAL
EOSINOPHIL # BLD AUTO: 0.3 10E9/L (ref 0–0.7)
EOSINOPHIL NFR BLD AUTO: 3.8 %
ERYTHROCYTE [DISTWIDTH] IN BLOOD BY AUTOMATED COUNT: 14.8 % (ref 10–15)
FSH SERPL-ACNC: 7.7 IU/L
GFR SERPL CREATININE-BSD FRML MDRD: 86 ML/MIN/{1.73_M2}
GLUCOSE SERPL-MCNC: 63 MG/DL (ref 70–99)
HBA1C MFR BLD: 5.1 % (ref 0–5.6)
HCG UR QL: NEGATIVE
HCT VFR BLD AUTO: 35.1 % (ref 35–47)
HGB BLD-MCNC: 10.9 G/DL (ref 11.7–15.7)
LYMPHOCYTES # BLD AUTO: 1.8 10E9/L (ref 0.8–5.3)
LYMPHOCYTES NFR BLD AUTO: 20.6 %
MCH RBC QN AUTO: 26 PG (ref 26.5–33)
MCHC RBC AUTO-ENTMCNC: 31.1 G/DL (ref 31.5–36.5)
MCV RBC AUTO: 84 FL (ref 78–100)
MONOCYTES # BLD AUTO: 0.6 10E9/L (ref 0–1.3)
MONOCYTES NFR BLD AUTO: 6.4 %
NEUTROPHILS # BLD AUTO: 5.9 10E9/L (ref 1.6–8.3)
NEUTROPHILS NFR BLD AUTO: 68.7 %
PLATELET # BLD AUTO: 206 10E9/L (ref 150–450)
POTASSIUM SERPL-SCNC: 4.2 MMOL/L (ref 3.4–5.3)
PROT SERPL-MCNC: 6.7 G/DL (ref 6.8–8.8)
RBC # BLD AUTO: 4.19 10E12/L (ref 3.8–5.2)
SODIUM SERPL-SCNC: 140 MMOL/L (ref 133–144)
TSH SERPL DL<=0.005 MIU/L-ACNC: 1.9 MU/L (ref 0.4–4)
WBC # BLD AUTO: 8.6 10E9/L (ref 4–11)

## 2019-04-26 PROCEDURE — 36415 COLL VENOUS BLD VENIPUNCTURE: CPT | Performed by: FAMILY MEDICINE

## 2019-04-26 PROCEDURE — 83001 ASSAY OF GONADOTROPIN (FSH): CPT | Performed by: FAMILY MEDICINE

## 2019-04-26 PROCEDURE — G0145 SCR C/V CYTO,THINLAYER,RESCR: HCPCS | Performed by: FAMILY MEDICINE

## 2019-04-26 PROCEDURE — 99214 OFFICE O/P EST MOD 30 MIN: CPT | Performed by: FAMILY MEDICINE

## 2019-04-26 PROCEDURE — 81025 URINE PREGNANCY TEST: CPT | Performed by: FAMILY MEDICINE

## 2019-04-26 PROCEDURE — 84443 ASSAY THYROID STIM HORMONE: CPT | Performed by: FAMILY MEDICINE

## 2019-04-26 PROCEDURE — G0476 HPV COMBO ASSAY CA SCREEN: HCPCS | Performed by: FAMILY MEDICINE

## 2019-04-26 PROCEDURE — 84270 ASSAY OF SEX HORMONE GLOBUL: CPT | Performed by: FAMILY MEDICINE

## 2019-04-26 PROCEDURE — 84403 ASSAY OF TOTAL TESTOSTERONE: CPT | Performed by: FAMILY MEDICINE

## 2019-04-26 PROCEDURE — 83036 HEMOGLOBIN GLYCOSYLATED A1C: CPT | Performed by: FAMILY MEDICINE

## 2019-04-26 PROCEDURE — 85025 COMPLETE CBC W/AUTO DIFF WBC: CPT | Performed by: FAMILY MEDICINE

## 2019-04-26 PROCEDURE — 80053 COMPREHEN METABOLIC PANEL: CPT | Performed by: FAMILY MEDICINE

## 2019-04-26 RX ORDER — POLYETHYLENE GLYCOL 3350 17 G/17G
17 POWDER, FOR SOLUTION ORAL
COMMUNITY

## 2019-04-26 RX ORDER — BUDESONIDE 0.5 MG/2ML
0.5 INHALANT ORAL
COMMUNITY

## 2019-04-26 RX ORDER — CYCLOBENZAPRINE HCL 10 MG
10 TABLET ORAL 3 TIMES DAILY PRN
COMMUNITY

## 2019-04-26 RX ORDER — FAMOTIDINE 20 MG/1
20 TABLET, FILM COATED ORAL
COMMUNITY

## 2019-04-26 RX ORDER — PREGABALIN 100 MG/1
200 CAPSULE ORAL
COMMUNITY
Start: 2018-04-23

## 2019-04-26 RX ORDER — TRAZODONE HYDROCHLORIDE 50 MG/1
50 TABLET, FILM COATED ORAL
COMMUNITY

## 2019-04-26 RX ORDER — METOPROLOL TARTRATE 25 MG/1
12.5 TABLET, FILM COATED ORAL
COMMUNITY

## 2019-04-26 RX ORDER — OXYCODONE HYDROCHLORIDE 5 MG/1
5 TABLET ORAL
COMMUNITY

## 2019-04-26 RX ORDER — ONDANSETRON 4 MG/1
8 TABLET, ORALLY DISINTEGRATING ORAL
COMMUNITY

## 2019-04-26 RX ORDER — NICOTINE 21 MG/24HR
1 PATCH, TRANSDERMAL 24 HOURS TRANSDERMAL
COMMUNITY

## 2019-04-26 RX ORDER — TRAZODONE HYDROCHLORIDE 100 MG/1
50 TABLET ORAL
COMMUNITY

## 2019-04-26 RX ORDER — AMOXICILLIN 250 MG
1 CAPSULE ORAL
COMMUNITY

## 2019-04-26 ASSESSMENT — ANXIETY QUESTIONNAIRES
IF YOU CHECKED OFF ANY PROBLEMS ON THIS QUESTIONNAIRE, HOW DIFFICULT HAVE THESE PROBLEMS MADE IT FOR YOU TO DO YOUR WORK, TAKE CARE OF THINGS AT HOME, OR GET ALONG WITH OTHER PEOPLE: SOMEWHAT DIFFICULT
GAD7 TOTAL SCORE: 14
2. NOT BEING ABLE TO STOP OR CONTROL WORRYING: NEARLY EVERY DAY
6. BECOMING EASILY ANNOYED OR IRRITABLE: SEVERAL DAYS
1. FEELING NERVOUS, ANXIOUS, OR ON EDGE: NEARLY EVERY DAY
3. WORRYING TOO MUCH ABOUT DIFFERENT THINGS: NEARLY EVERY DAY
7. FEELING AFRAID AS IF SOMETHING AWFUL MIGHT HAPPEN: MORE THAN HALF THE DAYS
5. BEING SO RESTLESS THAT IT IS HARD TO SIT STILL: NOT AT ALL

## 2019-04-26 ASSESSMENT — MIFFLIN-ST. JEOR: SCORE: 1173.85

## 2019-04-26 ASSESSMENT — PATIENT HEALTH QUESTIONNAIRE - PHQ9
SUM OF ALL RESPONSES TO PHQ QUESTIONS 1-9: 14
5. POOR APPETITE OR OVEREATING: MORE THAN HALF THE DAYS

## 2019-04-26 NOTE — LETTER
May 9, 2019    Sailaja Garcias  [address]  [place] [address]    Dear ,  This letter is regarding your recent Pap smear (cervical cancer screening) and Human Papillomavirus (HPV) test.  We are happy to inform you that your Pap smear result is normal. Cervical cancer is closely linked with certain types of HPV. Your results showed no evidence of high-risk HPV.  Therefore we recommend you return in 5 years for your next pap smear and HPV test.  You will still need to return to the clinic every year for an annual exam and other preventive tests.  If you have additional questions regarding this result, please call our registered nurse, Elenita at [phone].  Sincerely,    Milo Cortes MD/[facility]

## 2019-04-26 NOTE — PATIENT INSTRUCTIONS
PLEASE CALL TO SCHEDULE YOUR MAMMOGRAM  [facility] [place] [phone]  [facility] [place] [phone]  [facility]   [phone]  Central Scheduling (all locations) [phone]     You can call to schedule radiology tests at the following numbers:    [facility] and [facility] (including mammograms, ultrasound, CT and MRI scans and Dexa Scans)    Call   Toll Free     [facility] (including mammograms, ultrasound, CT and MRI scans and Dexa Scans)    Call   Toll Free

## 2019-04-26 NOTE — PROGRESS NOTES
SUBJECTIVE:   Sailaja Garcias is a 44 year old female who presents to clinic today for the following   health issues:    Abdominal Pain      Duration: on and off x4-5 months     Description (location/character/radiation): RLQ and LLQ       Associated flank pain: None    Intensity:  moderate    Accompanying signs and symptoms:        Fever/Chills: no        Gas/Bloating: YES- Gas       Nausea/vomitting: no        Diarrhea: no        Dysuria or Hematuria: no     History (previous similar pain/trauma/previous testing): none     Precipitating or alleviating factors:       Pain worse with eating/BM/urination: no        Pain relieved by BM: no     Therapies tried and outcome: None    LMP:  About 4-5 months ago, unsure of date     The patient presents to clinic to establish care.  Desires to discuss multiple concerns.  Medical history significant for prolonged hospitalization.  Patient was admitted to the hospital in 2018 for infective endocarditis.  She states that she was diagnosed with pulmonary embolism and had 3 cardiac arrests.  She was in a medically induced coma and was later discharged to skilled nursing facility.  She continues to have an endotracheal tube that was inserted during her hospital admission.  Tube was capped for the last 2 months.  She continues to breathe without difficulty.    Patient was recently diagnosed with pneumonia.  Currently takes antibiotics.    Also desires to discuss amenorrhea.  She states that she has not had a menstrual cycle for the past 4 months.  Denies any pelvic pain or pelvic cramping.  No sexual activity for the past 4 months.  Currently not using anything for contraception.    Started menarche at 14-15. Periods were regular. Period lasted 5 days. Flow was light to heavy to light again. 28-30 days.   .  The patient states that she got pregnant 23 times!  Living children 4  1  as an infant.   3 elective abortions.   Others were SAB.     Additional  "history: as documented    Reviewed  and updated as needed this visit by clinical staff         Reviewed and updated as needed this visit by Provider         Patient Active Problem List   Diagnosis     CARDIOVASCULAR SCREENING; LDL GOAL LESS THAN 160     Fibromyalgia     Insomnia     Cervical cancer (H)     Generalized anxiety disorder     Chronic pain     Moderate recurrent major depression (H)     Eating disorder     Past Surgical History:   Procedure Laterality Date     BUNIONECTOMY Bilateral age 13     GYN SURGERY      D&C x 3     HC ENLARGE BREAST WITH IMPLANT      elective       Social History     Tobacco Use     Smoking status: Former Smoker     Packs/day: 0.50     Types: Cigarettes     Last attempt to quit: 2011     Years since quittin.0     Smokeless tobacco: Never Used     Tobacco comment: restarted two days ago   Substance Use Topics     Alcohol use: No     Alcohol/week: 0.0 oz     Comment: quit drinking 14     Family History   Problem Relation Age of Onset     Diabetes Mother      Hypertension Mother      Hyperlipidemia Mother      Osteoporosis Mother      Hypertension Father            ROS:  Constitutional, HEENT, cardiovascular, pulmonary, gi and gu systems are negative, except as otherwise noted.    OBJECTIVE:     BP 97/65   Pulse 67   Temp 97.9  F (36.6  C) (Oral)   Ht 1.626 m (5' 4\")   Wt 54.9 kg (121 lb)   LMP  (LMP Unknown)   SpO2 100%   BMI 20.77 kg/m    Body mass index is 20.77 kg/m .  GENERAL: healthy, alert and no distress  NECK: no adenopathy, no asymmetry, masses, or scars.  Capped Tracheostomy tube.   RESP: lungs clear to auscultation - no rales, rhonchi or wheezes  CV: regular rate and rhythm, normal S1 S2,  Etiology of secondary amenorrhea aBDOMEN: soft, nontender, no hepatosplenomegaly, no masses and bowel sounds normal   (female): normal female external genitalia, normal urethral meatus, vaginal mucosa, normal cervix/adnexa/uterus without masses or " discharge    Diagnostic Test Results:  Results for orders placed or performed in visit on 04/26/19 (from the past 24 hour(s))   Follicle stimulating hormone   Result Value Ref Range    FSH 7.7 IU/L   CBC with platelets differential   Result Value Ref Range    WBC 8.6 4.0 - 11.0 10e9/L    RBC Count 4.19 3.8 - 5.2 10e12/L    Hemoglobin 10.9 (L) 11.7 - 15.7 g/dL    Hematocrit 35.1 35.0 - 47.0 %    MCV 84 78 - 100 fl    MCH 26.0 (L) 26.5 - 33.0 pg    MCHC 31.1 (L) 31.5 - 36.5 g/dL    RDW 14.8 10.0 - 15.0 %    Platelet Count 206 150 - 450 10e9/L    % Neutrophils 68.7 %    % Lymphocytes 20.6 %    % Monocytes 6.4 %    % Eosinophils 3.8 %    % Basophils 0.5 %    Absolute Neutrophil 5.9 1.6 - 8.3 10e9/L    Absolute Lymphocytes 1.8 0.8 - 5.3 10e9/L    Absolute Monocytes 0.6 0.0 - 1.3 10e9/L    Absolute Eosinophils 0.3 0.0 - 0.7 10e9/L    Absolute Basophils 0.0 0.0 - 0.2 10e9/L    Diff Method Automated Method    Hemoglobin A1c   Result Value Ref Range    Hemoglobin A1C 5.1 0 - 5.6 %   Comprehensive metabolic panel   Result Value Ref Range    Sodium 140 133 - 144 mmol/L    Potassium 4.2 3.4 - 5.3 mmol/L    Chloride 105 94 - 109 mmol/L    Carbon Dioxide 30 20 - 32 mmol/L    Anion Gap 5 3 - 14 mmol/L    Glucose 63 (L) 70 - 99 mg/dL    Urea Nitrogen 13 7 - 30 mg/dL    Creatinine 0.82 0.52 - 1.04 mg/dL    GFR Estimate 86 >60 mL/min/[1.73_m2]    GFR Estimate If Black >90 >60 mL/min/[1.73_m2]    Calcium 9.1 8.5 - 10.1 mg/dL    Bilirubin Total 0.2 0.2 - 1.3 mg/dL    Albumin 2.8 (L) 3.4 - 5.0 g/dL    Protein Total 6.7 (L) 6.8 - 8.8 g/dL    Alkaline Phosphatase 96 40 - 150 U/L    ALT 32 0 - 50 U/L    AST 41 0 - 45 U/L   TSH with free T4 reflex   Result Value Ref Range    TSH 1.90 0.40 - 4.00 mU/L   HCG Qual, Urine ([id number])   Result Value Ref Range    HCG Qual Urine Negative NEG^Negative       ASSESSMENT/PLAN:   1. Amenorrhea, secondary  Assessment: Etiology of the secondary aminuria is not clear at this time.  Differential diagnosis  includes thyroid dysfunction, hormonal imbalance secondary to prolonged hospital stay.  Work-up started today.  Plan:  - Follicle stimulating hormone  - Sex Hormone Binding Globulin  - Testosterone total  - US Pelvic Complete w Transvaginal; Future  - CBC with platelets differential  - Hemoglobin A1c  - Comprehensive metabolic panel  - HCG Qual, Urine ([id number])  - TSH with free T4 reflex  -Return to clinic after 1 to 2 weeks to review lab results.  Will consider progesterone challenge in the next visit.     2. Tracheostomy present (H)  Tracheostomy tube was capped over the past 2 months.  Patient is denies any concerns.  Desires to have it removed.  Refer to ENT.  - TSH with free T4 reflex  - OTOLARYNGOLOGY REFERRAL    3. Screening for malignant neoplasm of cervix  - HPV High Risk Types DNA Cervical  - Pap imaged thin layer screen with HPV - recommended age 30 - 65 years (select HPV order below)    4. Acute pneumonia  The patient was recently diagnosed with pneumonia.  She is currently taking antibiotics.  Good air entry in both lung fields.    5. Anemia due to protein deficiency  CBC shows acute anemia.  Likely secondary to recent prolonged hospitalization.    - US Pelvic Complete w Transvaginal; Future  - CBC with platelets differential    Follow-up: Return to clinic in 1 to 2 weeks to review lab results.    Milo Cortes MD  [facility]

## 2019-04-26 NOTE — NURSING NOTE
"Chief Complaint   Patient presents with     Abdominal Pain     BP 97/65   Pulse 67   Temp 97.9  F (36.6  C) (Oral)   Ht 1.626 m (5' 4\")   Wt 54.9 kg (121 lb)   LMP  (LMP Unknown)   SpO2 100%   BMI 20.77 kg/m   Estimated body mass index is 20.77 kg/m  as calculated from the following:    Height as of this encounter: 1.626 m (5' 4\").    Weight as of this encounter: 54.9 kg (121 lb).  Medication Reconciliation: complete      Health Maintenance that is potentially due pending provider review:  Mammogram, Pap Smear, PHQ9 and GAD7    Gave pt phone number/pended order to schedule mammo and/or colonoscopy(or FIT)  Completing pap smear today.  Completing forms today.    CITLALLI Verduzco  "

## 2019-04-27 ASSESSMENT — ANXIETY QUESTIONNAIRES: GAD7 TOTAL SCORE: 14

## 2019-04-29 LAB — SHBG SERPL-SCNC: 145 NMOL/L (ref 30–135)

## 2019-04-30 LAB
COPATH REPORT: NORMAL
PAP: NORMAL
TESTOST SERPL-MCNC: 14 NG/DL (ref 8–60)

## 2019-05-01 LAB
FINAL DIAGNOSIS: NORMAL
HPV HR 12 DNA CVX QL NAA+PROBE: NEGATIVE
HPV16 DNA SPEC QL NAA+PROBE: NEGATIVE
HPV18 DNA SPEC QL NAA+PROBE: NEGATIVE
SPECIMEN DESCRIPTION: NORMAL
SPECIMEN SOURCE CVX/VAG CYTO: NORMAL

## 2019-05-08 NOTE — RESULT ENCOUNTER NOTE
Called the patient to discuss results and left a voice message advising her to call us back.      Printed to call the patient to discuss all lab results are within normal ranges except for a mild decrease in her hemoglobin and total protein level.  Both abnormalities are likely secondary to prolonged recent hospitalization.  I would recommend iron supplement and retesting in 2 to 4 weeks.    FSH, sex hormone binding globulin and testosterone level are all within expected.  Patient will benefit from progesterone challenge in the next visit.    Milo Cortes MD

## 2019-09-16 ENCOUNTER — TELEPHONE (OUTPATIENT)
Dept: FAMILY MEDICINE | Facility: CLINIC | Age: 44
End: 2019-09-16

## 2019-10-02 ENCOUNTER — HEALTH MAINTENANCE LETTER (OUTPATIENT)
Age: 44
End: 2019-10-02

## 2021-01-15 ENCOUNTER — HEALTH MAINTENANCE LETTER (OUTPATIENT)
Age: 46
End: 2021-01-15

## 2021-09-04 ENCOUNTER — HEALTH MAINTENANCE LETTER (OUTPATIENT)
Age: 46
End: 2021-09-04

## 2022-02-19 ENCOUNTER — HEALTH MAINTENANCE LETTER (OUTPATIENT)
Age: 47
End: 2022-02-19

## 2022-09-28 NOTE — TELEPHONE ENCOUNTER
Patient offered follow-up appointment in [place] today, but cannot arrange transportation.  Will check if a video visit is an option.      See message from 9/26/22.   clonazePAM (KLONOPIN) 1 MG tablet      Last Written Prescription Date:  8/14/17  Last Fill Quantity: 30,   # refills: 0  Last Office Visit with [facility], [facility] or [facility] prescribing provider: 2/6/17  Future Office visit:       Routing refill request to provider for review/approval because:  Drug not on the [facility], [facility] or [facility] refill protocol or controlled substance      hydrOXYzine (VISTARIL) 25 MG capsule      Last Written Prescription Date: 6/22/17  Last Fill Quantity: 120,  # refills: 0   Last Office Visit with [facility], [facility] or [facility] prescribing provider: 2/6/17

## 2022-10-22 ENCOUNTER — HEALTH MAINTENANCE LETTER (OUTPATIENT)
Age: 47
End: 2022-10-22

## 2023-04-01 ENCOUNTER — HEALTH MAINTENANCE LETTER (OUTPATIENT)
Age: 48
End: 2023-04-01